# Patient Record
Sex: MALE | Race: WHITE | Employment: FULL TIME | URBAN - NONMETROPOLITAN AREA
[De-identification: names, ages, dates, MRNs, and addresses within clinical notes are randomized per-mention and may not be internally consistent; named-entity substitution may affect disease eponyms.]

---

## 2019-01-30 ENCOUNTER — HOSPITAL ENCOUNTER (OUTPATIENT)
Dept: GENERAL RADIOLOGY | Age: 54
Discharge: HOME | End: 2019-02-01
Payer: COMMERCIAL

## 2019-01-30 ENCOUNTER — OFFICE VISIT (OUTPATIENT)
Dept: PRIMARY CARE CLINIC | Age: 54
End: 2019-01-30
Payer: COMMERCIAL

## 2019-01-30 VITALS
WEIGHT: 180 LBS | DIASTOLIC BLOOD PRESSURE: 80 MMHG | TEMPERATURE: 98.2 F | OXYGEN SATURATION: 98 % | SYSTOLIC BLOOD PRESSURE: 126 MMHG | HEART RATE: 70 BPM

## 2019-01-30 DIAGNOSIS — Z72.0 TOBACCO ABUSE: ICD-10-CM

## 2019-01-30 DIAGNOSIS — R07.81 RIB PAIN ON RIGHT SIDE: Primary | ICD-10-CM

## 2019-01-30 DIAGNOSIS — R07.81 RIB PAIN ON RIGHT SIDE: ICD-10-CM

## 2019-01-30 PROCEDURE — 99213 OFFICE O/P EST LOW 20 MIN: CPT | Performed by: FAMILY MEDICINE

## 2019-01-30 PROCEDURE — 71101 X-RAY EXAM UNILAT RIBS/CHEST: CPT

## 2019-01-30 ASSESSMENT — PATIENT HEALTH QUESTIONNAIRE - PHQ9
1. LITTLE INTEREST OR PLEASURE IN DOING THINGS: 0
SUM OF ALL RESPONSES TO PHQ9 QUESTIONS 1 & 2: 0
SUM OF ALL RESPONSES TO PHQ QUESTIONS 1-9: 0
SUM OF ALL RESPONSES TO PHQ QUESTIONS 1-9: 0
2. FEELING DOWN, DEPRESSED OR HOPELESS: 0

## 2019-04-22 ENCOUNTER — TELEPHONE (OUTPATIENT)
Dept: SURGERY | Age: 54
End: 2019-04-22

## 2019-04-22 ENCOUNTER — OFFICE VISIT (OUTPATIENT)
Dept: FAMILY MEDICINE CLINIC | Age: 54
End: 2019-04-22
Payer: COMMERCIAL

## 2019-04-22 VITALS
SYSTOLIC BLOOD PRESSURE: 128 MMHG | WEIGHT: 177 LBS | DIASTOLIC BLOOD PRESSURE: 90 MMHG | HEIGHT: 72 IN | BODY MASS INDEX: 23.98 KG/M2

## 2019-04-22 DIAGNOSIS — Z12.11 COLON CANCER SCREENING: ICD-10-CM

## 2019-04-22 DIAGNOSIS — Z00.00 WELLNESS EXAMINATION: Primary | ICD-10-CM

## 2019-04-22 PROCEDURE — 99396 PREV VISIT EST AGE 40-64: CPT | Performed by: FAMILY MEDICINE

## 2019-04-22 RX ORDER — VARENICLINE TARTRATE 25 MG
KIT ORAL
Qty: 1 BOX | Refills: 0 | Status: SHIPPED | OUTPATIENT
Start: 2019-04-22 | End: 2019-05-23

## 2019-04-22 NOTE — PATIENT INSTRUCTIONS
will tell you how often to have this done based on your overall health and other things that can increase your risk for heart attack and stroke. · Blood pressure. Have your blood pressure checked during a routine doctor visit. Your doctor will tell you how often to check your blood pressure based on your age, your blood pressure results, and other factors. · Prostate exam. Talk to your doctor about whether you should have a blood test (called a PSA test) for prostate cancer. Experts disagree on whether men should have this test. Some experts recommend that you discuss the benefits and risks of the test with your doctor. · Diabetes. Ask your doctor whether you should have tests for diabetes. · Vision. Some experts recommend that you have yearly exams for glaucoma and other age-related eye problems starting at age 48. · Hearing. Tell your doctor if you notice any change in your hearing. You can have tests to find out how well you hear. · Colon cancer. You should begin tests for colon cancer at age 48. You may have one of several tests. Your doctor will tell you how often to have tests based on your age and risk. Risks include whether you already had a precancerous polyp removed from your colon or whether your parent, brother, sister, or child has had colon cancer. · Heart attack and stroke risk. At least every 4 to 6 years, you should have your risk for heart attack and stroke assessed. Your doctor uses factors such as your age, blood pressure, cholesterol, and whether you smoke or have diabetes to show what your risk for a heart attack or stroke is over the next 10 years. · Abdominal aortic aneurysm. Ask your doctor whether you should have a test to check for an aneurysm. You may need a test if you ever smoked or if your parent, brother, sister, or child has had an aneurysm. When should you call for help?   Watch closely for changes in your health, and be sure to contact your doctor if you have any problems or symptoms that concern you. Where can you learn more? Go to https://chpepiceweb.[employer]. org and sign in to your [employer] account. Enter P361 in the [facility] box to learn more about \"Well Visit, Men 48 to 72: Care Instructions. \"     If you do not have an account, please click on the \"Sign Up Now\" link. Current as of: March 28, 2018  Content Version: 11.9  © 7799-2318 [employer]. Care instructions adapted under license by [facility] ([facility]). If you have questions about a medical condition or this instruction, always ask your healthcare professional. [place] [address] any warranty or liability for your use of this information. Patient Education        Deciding About Using Medicines To Quit Smoking  How can you decide about using medicines to quit smoking? What are the medicines you can use? Your doctor may prescribe varenicline (Chantix) or bupropion (Zyban). These medicines can help you cope with cravings for tobacco. They are pills that don't contain nicotine. You also can use nicotine replacement products. These do contain nicotine. There are many types. · Gum and lozenges slowly release nicotine into your mouth. · Patches stick to your skin. They slowly release nicotine into your bloodstream.  · An inhaler has a griffith that contains nicotine. You breathe in a puff of nicotine vapor through your mouth and throat. · Nasal spray releases a mist that contains nicotine. What are key points about this decision? · Using medicines can double your chances of quitting smoking. They can ease cravings and withdrawal symptoms. · Getting counseling along with using medicine can raise your chances of quitting even more. · If you smoke fewer than 5 cigarettes a day, you may not need medicines to help you quit smoking. · These medicines have less nicotine than cigarettes. And by itself, nicotine is not nearly as harmful as smoking.  The tars, carbon monoxide, and other toxic chemicals in tobacco cause the harmful effects. · The side effects of nicotine replacement products depend on the type of product. For example, a patch can make your skin red and itchy. Medicines in pill form can make you sick to your stomach. They can also cause dry mouth and trouble sleeping. For most people, the side effects are not bad enough to make them stop using the products. Why might you choose to use medicines to quit smoking? · You have tried on your own to stop smoking, but you were not able to stop. · You smoke more than 5 cigarettes a day. · You want to increase your chances of quitting smoking. · You want to reduce your cravings and withdrawal symptoms. · You feel the benefits of medicine outweigh the side effects. Why might you choose not to use medicine? · You want to try quitting on your own by stopping all at once (\"cold turkey\"). · You want to cut back slowly on the number of cigarettes you smoke. · You smoke fewer than 5 cigarettes a day. · You do not like using medicine. · You feel the side effects of medicines outweigh the benefits. · You are worried about the cost of medicines. Your decision  Thinking about the facts and your feelings can help you make a decision that is right for you. Be sure you understand the benefits and risks of your options, and think about what else you need to do before you make the decision. Where can you learn more? Go to https://andria.[employer]. org and sign in to your [employer] account. Enter O790 in the [facility] box to learn more about \"Deciding About Using Medicines To Quit Smoking. \"     If you do not have an account, please click on the \"Sign Up Now\" link. Current as of: September 26, 2018  Content Version: 11.9  © 7220-9126 [employer]. Care instructions adapted under license by [facility] ([facility]).  If you have questions about a medical condition or this instruction, always ask your healthcare elder. [address] any warranty or liability for your use of this information. Patient Education        varenicline  Pronunciation:  marj Dinero  Brand:  [place]  What is the most important information I should know about varenicline? Do not drink large amounts alcohol. Varenicline can increase the effects of alcohol or change the way you react to it. What is varenicline? Varenicline is a smoking cessation medicine. It is used together with behavior modification and counseling support to help you stop smoking. Varenicline may also be used for purposes not listed in this medication guide. What should I discuss with my health care provider before taking varenicline? You should not use varenicline if you used it in the past and had:  · a serious allergic reaction --trouble breathing, swelling in your face (lips, tongue, throat) or neck; or  · a serious skin reaction --blisters in your mouth, peeling skin rash. To make sure varenicline is safe for you, tell your doctor if you have:  · a history of depression or mental illness;  · a history of seizures;  · kidney disease (or if you are on dialysis);  · heart disease, circulation problems; or  · if you drink alcohol. It is not known whether this medicine will harm an unborn baby. Tell your doctor if you are pregnant or plan to become pregnant. It is not known whether varenicline passes into breast milk. However, if you breast-feed while using this medicine, your baby may spit up or vomit more than normal, and may have a seizure. Varenicline is not approved for use by anyone younger than 25years old. How should I take varenicline? Follow all directions on your prescription label. Do not take this medicine in larger or smaller amounts or for longer than recommended. When you first start taking varenicline, you will take a low dose and then gradually increase it over the first several days of treatment.  Follow your doctor's dosing instructions very carefully. You may choose from 3 ways to use varenicline. Ask your doctor which method is best for you:  · Set a date to quit smoking and start taking varenicline 1 week before that date. This will allow the drug to build up in your body. Make sure to quit smoking on your planned quit date. Take varenicline for a total of 12 weeks. · Start taking varenicline before you set a planned quit date. Once you start taking the medicine, choose a quit date that is between 8 and 35 days after you start treatment. Take varenicline for a total of 12 weeks. · Start taking varenicline and gradually reduce the number of cigarettes you smoke each day over a 12-week period, until you no longer smoke at all. Then take varenicline for another 12 weeks, for a total of 24 weeks. Take varenicline after eating. Take the medicine with a full glass of water. Use varenicline regularly to get the most benefit. Get your prescription refilled before you run out of medicine completely. You should remain under the care of a doctor while taking varenicline. Read all patient information, medication guides, and instruction sheets provided to you. Ask your doctor or pharmacist if you have any questions. You may have nicotine withdrawal symptoms when you stop smoking, including: increased appetite, weight gain, trouble sleeping, trouble concentrating, slower heart rate, having the urge to smoke, and feeling anxious, restless, depressed, angry, frustrated, or irritated. These symptoms may occur with or without using medication such as varenicline. Smoking cessation may also cause new or worsening mental health problems, such as depression. Store at room temperature away from moisture and heat. What happens if I miss a dose? Take the missed dose as soon as you remember. Skip the missed dose if it is almost time for your next scheduled dose. Do not take extra medicine to make up the missed dose.   What happens if I overdose? Seek emergency medical attention or call the Poison Help line at 1-999.789.9246. What should I avoid while taking varenicline? Do not drink large amounts alcohol while taking this medicine. Varenicline can increase the effects of alcohol or change the way you react to it. Some people taking varenicline have had unusual or aggressive behavior or forgetfulness while drinking alcohol. Do not use other medicines to quit smoking, unless your doctor tells you to. Using varenicline while wearing a nicotine patch can cause unpleasant side effects. This medicine may impair your thinking or reactions. You may also have mood or behavior changes when you quit smoking. Until you know how varenicline and the smoking cessation process are going to affect you, be careful if you drive or do anything that requires you to be cautious and alert. What are the possible side effects of varenicline? Get emergency medical help if you have signs of an allergic reaction: hives; difficulty breathing; swelling of your face, lips, tongue, or throat. Stop using varenicline and call your doctor at once if you have:  · a seizure (convulsions);  · thoughts about suicide or hurting yourself;  · strange dreams, sleepwalking, trouble sleeping;  · new or worsening mental health problems --mood or behavior changes, depression, agitation, hostility, aggression;  · heart attack symptoms --chest pain or pressure, pain spreading to your jaw or shoulder, nausea, sweating;  · stroke symptoms --sudden numbness or weakness (especially on one side of the body), slurred speech, problems with vision or balance; or  · severe skin reaction --swelling or redness of the skin, blisters in your mouth, or skin rash that spreads and causes blistering and peeling. Your family or other caregivers should also be alert to changes in your mood or behavior.   Common side effects may include:  · nausea (may persist for several months), vomiting;  · constipation, gas;  · sleep problems (insomnia); or  · unusual dreams. This is not a complete list of side effects and others may occur. Call your doctor for medical advice about side effects. You may report side effects to FDA at 2-303-VOJ-4303. What other drugs will affect varenicline? After you stop smoking, the doses of your other medications may need to be adjusted. Tell your doctor about all other medicines you use, especially:  · insulin;  · a blood thinner such as warfarin (Coumadin, Jantoven); or  · asthma medicine (theophylline and others). This list is not complete. Other drugs may interact with varenicline, including prescription and over-the-counter medicines, vitamins, and herbal products. Not all possible interactions are listed in this medication guide. Where can I get more information? Your pharmacist can provide more information about varenicline. Remember, keep this and all other medicines out of the reach of children, never share your medicines with others, and use this medication only for the indication prescribed. Every effort has been made to ensure that the information provided by [address] is accurate, up-to-date, and complete, but no guarantee is made to that effect. Drug information contained herein may be time sensitive. [facility] information has been compiled for use by healthcare practitioners and consumers in the [place] and therefore [facility] does not warrant that uses outside of the [place] are appropriate, unless specifically indicated otherwise. [facility]'s drug information does not endorse drugs, diagnose patients or recommend therapy. [facility] drug information is an informational resource designed to assist licensed healthcare practitioners in caring for their patients and/or to serve consumers viewing this service as a supplement to, and not a substitute for, the expertise, skill, knowledge and judgment of healthcare practitioners. The absence of a warning for a given drug or drug combination in no way should be construed to indicate that the drug or drug combination is safe, effective or appropriate for any given patient. [facility] does not assume any responsibility for any aspect of healthcare administered with the aid of information [facility] provides. The information contained herein is not intended to cover all possible uses, directions, precautions, warnings, drug interactions, allergic reactions, or adverse effects. If you have questions about the drugs you are taking, check with your doctor, nurse or pharmacist.  Copyright 1465-5659 [address]. Version: 8.03. Revision date: 12/21/2016. Care instructions adapted under license by [facility] ([facility]). If you have questions about a medical condition or this instruction, always ask your healthcare professional. [place] [address] any warranty or liability for your use of this information.

## 2019-04-22 NOTE — PROGRESS NOTES
History and Physical      Fernie Chowdhury  YOB: 1965    Date of Service:  4/22/2019    Chief Complaint:   Fernie Chowdhury is a 48 y.o. male who presents for complete physical examination. HPI: would like to quit smoking has been smoking around 31 years    Wt Readings from Last 3 Encounters:   04/22/19 177 lb (80.3 kg)   01/30/19 180 lb (81.6 kg)     BP Readings from Last 3 Encounters:   04/22/19 (!) 128/90   01/30/19 126/80       Patient Active Problem List   Diagnosis    Tobacco abuse       Preventive Care:  Health Maintenance   Topic Date Due    Hepatitis C screen  1965    Pneumococcal 0-64 years Vaccine (1 of 1 - PPSV23) 06/05/1971    HIV screen  06/05/1980    DTaP/Tdap/Td vaccine (1 - Tdap) 06/05/1984    Lipid screen  06/05/2005    Shingles Vaccine (1 of 2) 06/05/2015    Colon cancer screen colonoscopy  06/05/2015    Flu vaccine (Season Ended) 09/01/2019      Self-testicular exams: No  Sexual activity: has sex with females   Last eye exam: remote,   Exercise: no regular exercise  Seatbelt use: yes  Lipid panel:  No results found for: CHOL, TRIG, HDL, LDLCALC, LDLDIRECT    Living will: no,   additional information provided reviewed       There is no immunization history on file for this patient. No Known Allergies  Outpatient Medications Marked as Taking for the 4/22/19 encounter (Office Visit) with Moreno Carroll MD   Medication Sig Dispense Refill    MEGARED OMEGA-3 KRILL OIL PO Take by mouth         History reviewed. No pertinent past medical history. History reviewed. No pertinent surgical history. History reviewed. No pertinent family history.   Social History     Socioeconomic History    Marital status: Single     Spouse name: Not on file    Number of children: Not on file    Years of education: Not on file    Highest education level: Not on file   Occupational History    Not on file   Social Needs    Financial resource strain: Not on file   [employer] insecurity:     Worry: Not on file     Inability: Not on file    Transportation needs:     Medical: Not on file     Non-medical: Not on file   Tobacco Use    Smoking status: Current Every Day Smoker    Smokeless tobacco: Never Used   Substance and Sexual Activity    Alcohol use: Not on file    Drug use: Not on file    Sexual activity: Not on file   Lifestyle    Physical activity:     Days per week: Not on file     Minutes per session: Not on file    Stress: Not on file   Relationships    Social connections:     Talks on phone: Not on file     Gets together: Not on file     Attends Yazdanism service: Not on file     Active member of club or organization: Not on file     Attends meetings of clubs or organizations: Not on file     Relationship status: Not on file    Intimate partner violence:     Fear of current or ex partner: Not on file     Emotionally abused: Not on file     Physically abused: Not on file     Forced sexual activity: Not on file   Other Topics Concern    Not on file   Social History Narrative    Not on file       Review of Systems:      Physical Exam:   Vitals:    04/22/19 0756 04/22/19 0808   BP: (!) 140/90 (!) 128/90   Site: Right Upper Arm Right Upper Arm   Position: Sitting Sitting   Cuff Size: Medium Adult Medium Adult   Weight: 177 lb (80.3 kg)    Height: 6' (1.829 m)      Body mass index is 24.01 kg/m². Constitutional: He is oriented to person, place, and time. He appears well-developed and well-nourished. No distress. HEENT:   Head: Normocephalic and atraumatic. Right Ear: Tympanic membrane, external ear and ear canal normal.   Left Ear: Tympanic membrane, external ear and ear canal normal.   Nose: Nose normal.   Mouth/Throat: Oropharynx is clear and moist and mucous membranes are normal. No oropharyngeal exudate or posterior oropharyngeal erythema. He has no cervical adenopathy.    Eyes: Conjunctivae and extraocular motions are normal. Pupils are equal, round, and reactive to light.   Neck: Full passive range of motion without pain. Neck supple. No JVD present. Carotid bruit is not present. No mass and no thyromegaly present. Cardiovascular: Normal rate, regular rhythm, normal heart sounds and intact distal pulses. Exam reveals no gallop and no friction rub. No murmur heard. Pulmonary/Chest: Effort normal and breath sounds normal. No respiratory distress. He has no wheezes, rhonchi or rales. Abdominal: Soft, non-tender. Bowel sounds and aorta are normal. There is no organomegaly, mass or bruit. Genitourinary:  Deferred . Musculoskeletal: Normal range of motion, no synovitis. He exhibits no edema. Neurological: He is alert and oriented to person, place, and time. He has normal reflexes. No cranial nerve deficit. Coordination normal.   Skin: Skin is warm, dry and intact. No suspicious lesions are noted. Psychiatric: He has a normal mood and affect.  His speech is normal and behavior is normal. Judgment, cognition and memory are normal.     Assessment/Plan:  Tobacco use       Labs per orders  Advanced directives given and reviewed  chantix given   Refused hep c and hiv and but will discuss with spouse  Reviewed immunizations  Discussed colonoscopy and will make a referral   Vitamin d 3 2000 iu a day  As long as well follow up 1 year

## 2019-04-25 ENCOUNTER — HOSPITAL ENCOUNTER (OUTPATIENT)
Dept: LAB | Age: 54
Discharge: HOME | End: 2019-04-25
Payer: COMMERCIAL

## 2019-04-25 DIAGNOSIS — Z00.00 WELLNESS EXAMINATION: ICD-10-CM

## 2019-04-25 LAB
-: NORMAL
ABSOLUTE EOS #: 0.1 K/UL (ref 0–0.4)
ABSOLUTE IMMATURE GRANULOCYTE: NORMAL K/UL (ref 0–0.3)
ABSOLUTE LYMPH #: 2.2 K/UL (ref 1–4.8)
ABSOLUTE MONO #: 0.5 K/UL (ref 0.1–1.2)
AMORPHOUS: NORMAL
ANION GAP SERPL CALCULATED.3IONS-SCNC: 11 MMOL/L (ref 9–17)
BACTERIA: NORMAL
BASOPHILS # BLD: 1 % (ref 0–2)
BASOPHILS ABSOLUTE: 0.1 K/UL (ref 0–0.2)
BILIRUBIN URINE: NEGATIVE
BUN BLDV-MCNC: 10 MG/DL (ref 6–20)
BUN/CREAT BLD: 13 (ref 9–20)
CALCIUM SERPL-MCNC: 9.2 MG/DL (ref 8.6–10.4)
CASTS UA: NORMAL /LPF (ref 0–2)
CHLORIDE BLD-SCNC: 100 MMOL/L (ref 98–107)
CO2: 27 MMOL/L (ref 20–31)
COLOR: NORMAL
COMMENT UA: NORMAL
CREAT SERPL-MCNC: 0.76 MG/DL (ref 0.7–1.2)
CRYSTALS, UA: NORMAL /HPF
DIFFERENTIAL TYPE: NORMAL
EOSINOPHILS RELATIVE PERCENT: 1 % (ref 1–8)
EPITHELIAL CELLS UA: NORMAL /HPF (ref 0–5)
GFR AFRICAN AMERICAN: >60 ML/MIN
GFR NON-AFRICAN AMERICAN: >60 ML/MIN
GFR SERPL CREATININE-BSD FRML MDRD: ABNORMAL ML/MIN/{1.73_M2}
GFR SERPL CREATININE-BSD FRML MDRD: ABNORMAL ML/MIN/{1.73_M2}
GLUCOSE BLD-MCNC: 107 MG/DL (ref 70–99)
GLUCOSE URINE: NEGATIVE
HCT VFR BLD CALC: 48.1 % (ref 41–53)
HEMOGLOBIN: 15.7 G/DL (ref 13.5–17.5)
IMMATURE GRANULOCYTES: NORMAL %
KETONES, URINE: NEGATIVE
LEUKOCYTE ESTERASE, URINE: NEGATIVE
LYMPHOCYTES # BLD: 31 % (ref 15–43)
MCH RBC QN AUTO: 31.5 PG (ref 26–34)
MCHC RBC AUTO-ENTMCNC: 32.8 G/DL (ref 31–37)
MCV RBC AUTO: 96.2 FL (ref 80–100)
MONOCYTES # BLD: 7 % (ref 6–14)
MUCUS: NORMAL
NITRITE, URINE: NEGATIVE
NRBC AUTOMATED: NORMAL PER 100 WBC
OTHER OBSERVATIONS UA: NORMAL
PDW BLD-RTO: 13.2 % (ref 11–14.5)
PH UA: 6 (ref 5–6)
PLATELET # BLD: 233 K/UL (ref 140–450)
PLATELET ESTIMATE: NORMAL
PMV BLD AUTO: 9.1 FL (ref 6–12)
POTASSIUM SERPL-SCNC: 4.2 MMOL/L (ref 3.7–5.3)
PROSTATE SPECIFIC ANTIGEN: 0.77 UG/L
PROTEIN UA: NEGATIVE
RBC # BLD: 4.99 M/UL (ref 4.5–5.9)
RBC # BLD: NORMAL 10*6/UL
RBC UA: NORMAL /HPF (ref 0–4)
RENAL EPITHELIAL, UA: NORMAL /HPF
SEG NEUTROPHILS: 60 % (ref 44–74)
SEGMENTED NEUTROPHILS ABSOLUTE COUNT: 4.2 K/UL (ref 1.8–7.7)
SODIUM BLD-SCNC: 138 MMOL/L (ref 135–144)
SPECIFIC GRAVITY UA: 1.01 (ref 1.01–1.02)
TRICHOMONAS: NORMAL
TURBIDITY: NORMAL
URINE HGB: NEGATIVE
UROBILINOGEN, URINE: NORMAL
WBC # BLD: 7.1 K/UL (ref 3.5–11)
WBC # BLD: NORMAL 10*3/UL
WBC UA: NORMAL /HPF (ref 0–4)
YEAST: NORMAL

## 2019-04-25 PROCEDURE — 81001 URINALYSIS AUTO W/SCOPE: CPT

## 2019-04-25 PROCEDURE — G0103 PSA SCREENING: HCPCS

## 2019-04-25 PROCEDURE — 85025 COMPLETE CBC W/AUTO DIFF WBC: CPT

## 2019-04-25 PROCEDURE — 36415 COLL VENOUS BLD VENIPUNCTURE: CPT

## 2019-04-25 PROCEDURE — 80048 BASIC METABOLIC PNL TOTAL CA: CPT

## 2019-05-23 RX ORDER — VARENICLINE TARTRATE 1 MG/1
1 TABLET, FILM COATED ORAL 2 TIMES DAILY
Qty: 60 TABLET | Refills: 5 | Status: SHIPPED | OUTPATIENT
Start: 2019-05-23 | End: 2021-07-19 | Stop reason: SDUPTHER

## 2019-07-26 ENCOUNTER — HOSPITAL ENCOUNTER (EMERGENCY)
Age: 54
Discharge: HOME | End: 2019-07-26
Attending: EMERGENCY MEDICINE
Payer: COMMERCIAL

## 2019-07-26 VITALS
WEIGHT: 180 LBS | RESPIRATION RATE: 16 BRPM | BODY MASS INDEX: 25.2 KG/M2 | HEIGHT: 71 IN | DIASTOLIC BLOOD PRESSURE: 86 MMHG | SYSTOLIC BLOOD PRESSURE: 128 MMHG | TEMPERATURE: 97.7 F | HEART RATE: 71 BPM | OXYGEN SATURATION: 98 %

## 2019-07-26 DIAGNOSIS — T15.92XA ACUTE FOREIGN BODY OF LEFT EYE, INITIAL ENCOUNTER: Primary | ICD-10-CM

## 2019-07-26 PROCEDURE — 99282 EMERGENCY DEPT VISIT SF MDM: CPT

## 2019-07-26 PROCEDURE — 6370000000 HC RX 637 (ALT 250 FOR IP): Performed by: EMERGENCY MEDICINE

## 2019-07-26 RX ORDER — ERYTHROMYCIN 5 MG/G
OINTMENT OPHTHALMIC ONCE
Status: COMPLETED | OUTPATIENT
Start: 2019-07-26 | End: 2019-07-26

## 2019-07-26 RX ADMIN — ERYTHROMYCIN: 5 OINTMENT OPHTHALMIC at 01:47

## 2019-07-26 NOTE — ED PROVIDER NOTES
placed erythromycin ophthalmic in the left eye and I told him to do this 3 times a day and he was given instructions to use an eye patch as well    At this time the patient is without objective evidence of an acute process requiring hospitalization or inpatient management. They have remained hemodynamically stable throughout their entire ED visit and are stable for discharge with outpatient follow-up. Standard anticipatory guidance given to patient upon discharge. Have given them a specific time frame in which to follow-up and who to follow-up with. I have also advised them that they should return to the emergency department if they get worse, or not getting better or develop any new or concerning symptoms. Patient demonstrates understanding. PROCEDURES:  Unless otherwise noted below, none     Procedures    FINAL IMPRESSION      1.  Acute foreign body of left eye, initial encounter          DISPOSITION/PLAN   DISPOSITION Decision To Discharge 07/26/2019 01:37:54 AM      PATIENT REFERRED TO:  Your eye doctor tomorrow    In 1 day      Aurea Sanchez MD  [address] [place] [address]  [phone]            DISCHARGE MEDICATIONS:  Discharge Medication List as of 7/26/2019  1:50 AM             (Please note that portions of this note were completed with a voice recognition program.  Efforts were made to edit the dictations but occasionally words are mis-transcribed.)    Shyla Alberts DO (electronically signed)  Board Certified Emergency Physician          Shyla Alberts DO  07/26/19 0211

## 2019-10-01 ENCOUNTER — OFFICE VISIT (OUTPATIENT)
Dept: FAMILY MEDICINE CLINIC | Age: 54
End: 2019-10-01
Payer: COMMERCIAL

## 2019-10-01 VITALS
HEART RATE: 83 BPM | DIASTOLIC BLOOD PRESSURE: 86 MMHG | WEIGHT: 173 LBS | OXYGEN SATURATION: 98 % | BODY MASS INDEX: 24.13 KG/M2 | SYSTOLIC BLOOD PRESSURE: 128 MMHG

## 2019-10-01 DIAGNOSIS — L98.9 SKIN LESION: Primary | ICD-10-CM

## 2019-10-01 DIAGNOSIS — Z23 NEED FOR HEPATITIS B VACCINATION: ICD-10-CM

## 2019-10-01 DIAGNOSIS — Z12.11 SCREENING FOR COLON CANCER: ICD-10-CM

## 2019-10-01 PROCEDURE — 99213 OFFICE O/P EST LOW 20 MIN: CPT | Performed by: FAMILY MEDICINE

## 2019-10-01 PROCEDURE — 90471 IMMUNIZATION ADMIN: CPT | Performed by: FAMILY MEDICINE

## 2019-10-01 PROCEDURE — 90746 HEPB VACCINE 3 DOSE ADULT IM: CPT | Performed by: FAMILY MEDICINE

## 2019-10-01 ASSESSMENT — ENCOUNTER SYMPTOMS
RESPIRATORY NEGATIVE: 1
GASTROINTESTINAL NEGATIVE: 1

## 2019-10-04 ENCOUNTER — TELEPHONE (OUTPATIENT)
Dept: SURGERY | Age: 54
End: 2019-10-04

## 2019-10-04 RX ORDER — OMEPRAZOLE 20 MG/1
20 CAPSULE, DELAYED RELEASE ORAL DAILY PRN
COMMUNITY

## 2019-11-14 ENCOUNTER — NURSE ONLY (OUTPATIENT)
Dept: LAB | Age: 54
End: 2019-11-14
Payer: COMMERCIAL

## 2019-11-14 DIAGNOSIS — Z23 NEED FOR HEPATITIS B VACCINATION: ICD-10-CM

## 2019-11-14 PROCEDURE — 90471 IMMUNIZATION ADMIN: CPT | Performed by: FAMILY MEDICINE

## 2019-11-14 PROCEDURE — 90746 HEPB VACCINE 3 DOSE ADULT IM: CPT | Performed by: FAMILY MEDICINE

## 2021-02-01 ENCOUNTER — OFFICE VISIT (OUTPATIENT)
Dept: ORTHOPEDIC SURGERY | Age: 56
End: 2021-02-01
Payer: COMMERCIAL

## 2021-02-01 ENCOUNTER — HOSPITAL ENCOUNTER (OUTPATIENT)
Dept: LAB | Age: 56
Discharge: HOME | End: 2021-02-01
Payer: COMMERCIAL

## 2021-02-01 VITALS
DIASTOLIC BLOOD PRESSURE: 87 MMHG | HEIGHT: 71 IN | WEIGHT: 173 LBS | SYSTOLIC BLOOD PRESSURE: 124 MMHG | BODY MASS INDEX: 24.22 KG/M2 | HEART RATE: 93 BPM

## 2021-02-01 DIAGNOSIS — M79.89 FINGER SWELLING: Primary | ICD-10-CM

## 2021-02-01 DIAGNOSIS — M79.89 FINGER SWELLING: ICD-10-CM

## 2021-02-01 LAB
ABSOLUTE EOS #: 0.1 K/UL (ref 0–0.44)
ABSOLUTE IMMATURE GRANULOCYTE: 0.05 K/UL (ref 0–0.3)
ABSOLUTE LYMPH #: 2.99 K/UL (ref 1.1–3.7)
ABSOLUTE MONO #: 0.79 K/UL (ref 0.1–1.2)
BASOPHILS # BLD: 1 % (ref 0–2)
BASOPHILS ABSOLUTE: 0.07 K/UL (ref 0–0.2)
C-REACTIVE PROTEIN: 8.6 MG/L (ref 0–5)
DIFFERENTIAL TYPE: ABNORMAL
EOSINOPHILS RELATIVE PERCENT: 1 % (ref 1–4)
HCT VFR BLD CALC: 48.2 % (ref 40.7–50.3)
HEMOGLOBIN: 15.7 G/DL (ref 13–17)
IMMATURE GRANULOCYTES: 0 %
LYMPHOCYTES # BLD: 26 % (ref 24–43)
MCH RBC QN AUTO: 31 PG (ref 25.2–33.5)
MCHC RBC AUTO-ENTMCNC: 32.6 G/DL (ref 25.2–33.5)
MCV RBC AUTO: 95.3 FL (ref 82.6–102.9)
MONOCYTES # BLD: 7 % (ref 3–12)
NRBC AUTOMATED: 0 PER 100 WBC
PDW BLD-RTO: 12.5 % (ref 11.8–14.4)
PLATELET # BLD: 246 K/UL (ref 138–453)
PLATELET ESTIMATE: ABNORMAL
PMV BLD AUTO: 10 FL (ref 8.1–13.5)
RBC # BLD: 5.06 M/UL (ref 4.21–5.77)
RBC # BLD: ABNORMAL 10*6/UL
RHEUMATOID FACTOR: <10 IU/ML
SEDIMENTATION RATE, ERYTHROCYTE: 4 MM (ref 0–20)
SEG NEUTROPHILS: 65 % (ref 36–65)
SEGMENTED NEUTROPHILS ABSOLUTE COUNT: 7.72 K/UL (ref 1.5–8.1)
WBC # BLD: 11.7 K/UL (ref 3.5–11.3)
WBC # BLD: ABNORMAL 10*3/UL

## 2021-02-01 PROCEDURE — 86038 ANTINUCLEAR ANTIBODIES: CPT

## 2021-02-01 PROCEDURE — 99203 OFFICE O/P NEW LOW 30 MIN: CPT | Performed by: ORTHOPAEDIC SURGERY

## 2021-02-01 PROCEDURE — 85651 RBC SED RATE NONAUTOMATED: CPT

## 2021-02-01 PROCEDURE — 86431 RHEUMATOID FACTOR QUANT: CPT

## 2021-02-01 PROCEDURE — 36415 COLL VENOUS BLD VENIPUNCTURE: CPT

## 2021-02-01 PROCEDURE — 86140 C-REACTIVE PROTEIN: CPT

## 2021-02-01 PROCEDURE — 85025 COMPLETE CBC W/AUTO DIFF WBC: CPT

## 2021-02-01 RX ORDER — DOXYCYCLINE HYCLATE 100 MG
TABLET ORAL
COMMUNITY
Start: 2021-01-31 | End: 2021-02-17

## 2021-02-01 NOTE — PROGRESS NOTES
Orthopedic Office Note  [facility]  [address]  [address], [address]  [facility] [address] [phone]      CHIEF COMPLAINT:    Chief Complaint   Patient presents with    Hand Pain     left hand, index finger - poss forign body? HISTORY OF PRESENT ILLNESS:      The patient is a 54 y.o. male  who presents today left index finger swelling which started over a week ago. He works as  but does not recall ever getting anything lodged into his finger. He noticed swelling in the finger which has progressed. Last night he presented to urgent care where he was given a shot of Rocephin and placed on doxycycline. He denies fevers, chills, or night sweats. He never has noticed any open wounds. Past Medical History:    No past medical history on file. Past Surgical History:    Past Surgical History:   Procedure Laterality Date    COLONOSCOPY  11/01/2019       Medications Prior to Admission:   Current Outpatient Medications   Medication Sig Dispense Refill    doxycycline hyclate (VIBRA-TABS) 100 MG tablet take 1 tablet by mouth twice a day for 10 days      bisacodyl (BISACODYL) 5 MG EC tablet Follow bowel prep instructions given by physician's office. 2 tablet 0    omeprazole (PRILOSEC) 20 MG delayed release capsule Take 20 mg by mouth daily      varenicline (CHANTIX) 1 MG tablet Take 1 tablet by mouth 2 times daily (Patient not taking: Reported on 10/1/2019) 60 tablet 5    MEGARED OMEGA-3 KRILL OIL PO Take by mouth       No current facility-administered medications for this visit. Allergies:  Patient has no known allergies.     Social History:   Social History     Tobacco Use   Smoking Status Current Every Day Smoker   Smokeless Tobacco Never Used     Social History     Substance and Sexual Activity   Alcohol Use None    Comment: occsionally     Social History     Substance and Sexual Activity   Drug Use Not on file       Family History:  No family history on file. REVIEW OF SYSTEMS:  Please see the ROS form attached to today's encounter. I have reviewed it with the patient during the visit. All other systems were reviewed and are negative. PHYSICAL EXAM:  On exam today he is alert and oriented x3. He is in no obvious distress. His general appearance within normal limits. Left index finger is moderately swollen. There is just mild redness over the palmar aspect of the middle phalanx of the index finger. He has full painless finger range of motion. He has just slight discomfort with palpation over the middle phalanx volar aspect. He has independent IP joint flexion without difficulty and against resistance. He has full extension without any difficulty. He has no pain with passive stretch. He has normal sensation with good capillary refill. Radiology:  X-rays reviewed and show diffuse soft tissue swelling and a foreign body within the tip of the finger tuft which does not localize to the area of maximal swelling in the finger    ASSESSMENT/PLAN:  1. Finger swelling        Clinically this either represents a low-grade infection or noninfectious tenosynovitis. No sign of flexor infectious tenosynovitis today. No abscess. We will continue with the antibiotic. If this does not resolve his issue then we will place him on an anti-inflammatory. He will be leaving for [place] and 3 days. I reviewed signs of infection with him and if these develop he will present to the emergency room. He will follow-up with me once he returns from [place] in 2 weeks. We will obtain baseline laboratory studies including a CRP with differential, sedimentation rate, ESR, rheumatoid factor, CHAYITO. No orders of the defined types were placed in this encounter.        Prath Medeiros MD

## 2021-02-02 LAB — ANTI-NUCLEAR ANTIBODY (ANA): NEGATIVE

## 2021-02-17 ENCOUNTER — OFFICE VISIT (OUTPATIENT)
Dept: ORTHOPEDIC SURGERY | Age: 56
End: 2021-02-17
Payer: COMMERCIAL

## 2021-02-17 VITALS
HEIGHT: 71 IN | BODY MASS INDEX: 24.22 KG/M2 | DIASTOLIC BLOOD PRESSURE: 84 MMHG | WEIGHT: 173 LBS | SYSTOLIC BLOOD PRESSURE: 136 MMHG | HEART RATE: 83 BPM

## 2021-02-17 DIAGNOSIS — M79.89 FINGER SWELLING: Primary | ICD-10-CM

## 2021-02-17 PROCEDURE — 99213 OFFICE O/P EST LOW 20 MIN: CPT | Performed by: ORTHOPAEDIC SURGERY

## 2021-02-18 ENCOUNTER — PRE-PROCEDURE TELEPHONE (OUTPATIENT)
Dept: PREADMISSION TESTING | Age: 56
End: 2021-02-18

## 2021-02-18 NOTE — PROGRESS NOTES
History and Physical    Patient's Name/Date of Birth: Franko Cadena / 1965, 54 y.o. yo    Date: February 17, 2021     PCP: No primary care provider on file. History of Present Illness: This 59-year-old right-hand dominant male presents with them nearly 6-week history of localized swelling to his right index finger with no known specific history of injury or trauma. Patient reportedly may have had a foreign body to the digit but is uncertain of the specific incident. He works as an  and is exposed to fine particles throughout his work days. Because of the increased pain and swelling of the initial onset he was treated with a course of antibiotics consisting of Keflex as well as a course of steroids and noted that there was initially some improvement of the swelling. Recently had increased swelling and had administration of Rocephin noting no significant change in clinical signs. Remaining past medical history was noncontributory. He has no known history of gout or rheumatoid arthritis. No past medical history on file. Past Surgical History:   Procedure Laterality Date    COLONOSCOPY  11/01/2019      Allergies: Patient has no known allergies. Current Outpatient Medications   Medication Sig Dispense Refill    omeprazole (PRILOSEC) 20 MG delayed release capsule Take 20 mg by mouth daily      MEGARED OMEGA-3 KRILL OIL PO Take by mouth      varenicline (CHANTIX) 1 MG tablet Take 1 tablet by mouth 2 times daily (Patient not taking: Reported on 10/1/2019) 60 tablet 5     No current facility-administered medications for this visit. Social History     Tobacco Use    Smoking status: Current Every Day Smoker    Smokeless tobacco: Never Used   Substance Use Topics    Alcohol use: Not on file     Comment: occsionally        Review of Systems:  Notes no numbness or tingling.   Other than stated above in the HPI is negative      Physical exam:     General appearance: no acute distress  Head: NAD  Neck: supple  Lungs: No audible wheezing, respiratory rate normal  Heart: Perfusion to all extremeties  Extremities: Examination of his right hand revealed significant localized swelling to the middle phalanx with no skin changes. He was nontender to flexion sheath. With attempted flexion he was able to come within 3 cm of the distal palmar flexion crease. He was assessed to be neurovascular intact. Assessment:  Patient presents with probable signs consistent with a foreign body reaction to his right index finger at the level of P2.        Plan:  Recommendation was surgical excisional debridement and irrigation. Wished to proceed with surgical treatment given the refractory symptoms. No orders of the defined types were placed in this encounter.               JEANA BRAND MD 2/17/2021 at 7:40 PM

## 2021-02-19 ENCOUNTER — HOSPITAL ENCOUNTER (OUTPATIENT)
Dept: PREADMISSION TESTING | Age: 56
Setting detail: SPECIMEN
Discharge: HOME | End: 2021-02-23
Payer: COMMERCIAL

## 2021-02-19 DIAGNOSIS — Z11.59 ENCOUNTER FOR SCREENING FOR OTHER VIRAL DISEASES: Primary | ICD-10-CM

## 2021-02-19 PROCEDURE — U0005 INFEC AGEN DETEC AMPLI PROBE: HCPCS

## 2021-02-19 PROCEDURE — U0003 INFECTIOUS AGENT DETECTION BY NUCLEIC ACID (DNA OR RNA); SEVERE ACUTE RESPIRATORY SYNDROME CORONAVIRUS 2 (SARS-COV-2) (CORONAVIRUS DISEASE [COVID-19]), AMPLIFIED PROBE TECHNIQUE, MAKING USE OF HIGH THROUGHPUT TECHNOLOGIES AS DESCRIBED BY CMS-2020-01-R: HCPCS

## 2021-02-21 LAB
SARS-COV-2: NORMAL
SARS-COV-2: NOT DETECTED
SOURCE: NORMAL

## 2021-02-24 ENCOUNTER — ANESTHESIA EVENT (OUTPATIENT)
Dept: OPERATING ROOM | Age: 56
End: 2021-02-24
Payer: COMMERCIAL

## 2021-02-24 ENCOUNTER — ANESTHESIA (OUTPATIENT)
Dept: OPERATING ROOM | Age: 56
End: 2021-02-24
Payer: COMMERCIAL

## 2021-02-24 ENCOUNTER — HOSPITAL ENCOUNTER (OUTPATIENT)
Age: 56
Setting detail: OUTPATIENT SURGERY
Discharge: HOME | End: 2021-02-24
Attending: ORTHOPAEDIC SURGERY | Admitting: ORTHOPAEDIC SURGERY
Payer: COMMERCIAL

## 2021-02-24 VITALS
RESPIRATION RATE: 8 BRPM | DIASTOLIC BLOOD PRESSURE: 62 MMHG | TEMPERATURE: 97.9 F | SYSTOLIC BLOOD PRESSURE: 131 MMHG | OXYGEN SATURATION: 96 %

## 2021-02-24 VITALS
OXYGEN SATURATION: 97 % | BODY MASS INDEX: 25.2 KG/M2 | SYSTOLIC BLOOD PRESSURE: 124 MMHG | RESPIRATION RATE: 16 BRPM | HEART RATE: 50 BPM | HEIGHT: 71 IN | WEIGHT: 180 LBS | DIASTOLIC BLOOD PRESSURE: 83 MMHG | TEMPERATURE: 97.5 F

## 2021-02-24 DIAGNOSIS — M65.10 INFECTION OF FLEXOR TENDON SHEATH: Primary | ICD-10-CM

## 2021-02-24 LAB
DIRECT EXAM: NORMAL
Lab: NORMAL
SPECIMEN DESCRIPTION: NORMAL

## 2021-02-24 PROCEDURE — 3700000000 HC ANESTHESIA ATTENDED CARE: Performed by: ORTHOPAEDIC SURGERY

## 2021-02-24 PROCEDURE — 2500000003 HC RX 250 WO HCPCS: Performed by: NURSE ANESTHETIST, CERTIFIED REGISTERED

## 2021-02-24 PROCEDURE — 2580000003 HC RX 258: Performed by: ORTHOPAEDIC SURGERY

## 2021-02-24 PROCEDURE — 87205 SMEAR GRAM STAIN: CPT

## 2021-02-24 PROCEDURE — 2709999900 HC NON-CHARGEABLE SUPPLY: Performed by: ORTHOPAEDIC SURGERY

## 2021-02-24 PROCEDURE — 3700000001 HC ADD 15 MINUTES (ANESTHESIA): Performed by: ORTHOPAEDIC SURGERY

## 2021-02-24 PROCEDURE — 87075 CULTR BACTERIA EXCEPT BLOOD: CPT

## 2021-02-24 PROCEDURE — 6360000002 HC RX W HCPCS: Performed by: NURSE ANESTHETIST, CERTIFIED REGISTERED

## 2021-02-24 PROCEDURE — 3600000012 HC SURGERY LEVEL 2 ADDTL 15MIN: Performed by: ORTHOPAEDIC SURGERY

## 2021-02-24 PROCEDURE — 6360000002 HC RX W HCPCS: Performed by: ORTHOPAEDIC SURGERY

## 2021-02-24 PROCEDURE — 26020 DRAIN HAND TENDON SHEATH: CPT | Performed by: ORTHOPAEDIC SURGERY

## 2021-02-24 PROCEDURE — 3600000002 HC SURGERY LEVEL 2 BASE: Performed by: ORTHOPAEDIC SURGERY

## 2021-02-24 PROCEDURE — 87206 SMEAR FLUORESCENT/ACID STAI: CPT

## 2021-02-24 PROCEDURE — 7100000010 HC PHASE II RECOVERY - FIRST 15 MIN: Performed by: ORTHOPAEDIC SURGERY

## 2021-02-24 PROCEDURE — 87070 CULTURE OTHR SPECIMN AEROBIC: CPT

## 2021-02-24 PROCEDURE — 2500000003 HC RX 250 WO HCPCS: Performed by: ORTHOPAEDIC SURGERY

## 2021-02-24 PROCEDURE — 7100000011 HC PHASE II RECOVERY - ADDTL 15 MIN: Performed by: ORTHOPAEDIC SURGERY

## 2021-02-24 RX ORDER — PROPOFOL 10 MG/ML
INJECTION, EMULSION INTRAVENOUS PRN
Status: DISCONTINUED | OUTPATIENT
Start: 2021-02-24 | End: 2021-02-24

## 2021-02-24 RX ORDER — SODIUM CHLORIDE, SODIUM LACTATE, POTASSIUM CHLORIDE, CALCIUM CHLORIDE 600; 310; 30; 20 MG/100ML; MG/100ML; MG/100ML; MG/100ML
INJECTION, SOLUTION INTRAVENOUS CONTINUOUS
Status: DISCONTINUED | OUTPATIENT
Start: 2021-02-24 | End: 2021-02-24 | Stop reason: HOSPADM

## 2021-02-24 RX ORDER — TRAMADOL HYDROCHLORIDE 50 MG/1
50-100 TABLET ORAL EVERY 6 HOURS PRN
Qty: 30 TABLET | Refills: 0 | Status: SHIPPED | OUTPATIENT
Start: 2021-02-24 | End: 2021-03-10

## 2021-02-24 RX ORDER — MORPHINE SULFATE 2 MG/ML
1 INJECTION, SOLUTION INTRAMUSCULAR; INTRAVENOUS EVERY 5 MIN PRN
Status: DISCONTINUED | OUTPATIENT
Start: 2021-02-24 | End: 2021-02-24 | Stop reason: HOSPADM

## 2021-02-24 RX ORDER — MIDAZOLAM HYDROCHLORIDE 1 MG/ML
INJECTION INTRAMUSCULAR; INTRAVENOUS PRN
Status: DISCONTINUED | OUTPATIENT
Start: 2021-02-24 | End: 2021-02-24 | Stop reason: SDUPTHER

## 2021-02-24 RX ORDER — PROPOFOL 10 MG/ML
INJECTION, EMULSION INTRAVENOUS CONTINUOUS PRN
Status: DISCONTINUED | OUTPATIENT
Start: 2021-02-24 | End: 2021-02-24 | Stop reason: SDUPTHER

## 2021-02-24 RX ORDER — HYDROCODONE BITARTRATE AND ACETAMINOPHEN 5; 325 MG/1; MG/1
2 TABLET ORAL PRN
Status: DISCONTINUED | OUTPATIENT
Start: 2021-02-24 | End: 2021-02-24 | Stop reason: HOSPADM

## 2021-02-24 RX ORDER — FENTANYL CITRATE 50 UG/ML
INJECTION, SOLUTION INTRAMUSCULAR; INTRAVENOUS PRN
Status: DISCONTINUED | OUTPATIENT
Start: 2021-02-24 | End: 2021-02-24 | Stop reason: SDUPTHER

## 2021-02-24 RX ORDER — LIDOCAINE HYDROCHLORIDE 20 MG/ML
INJECTION, SOLUTION INFILTRATION; PERINEURAL PRN
Status: DISCONTINUED | OUTPATIENT
Start: 2021-02-24 | End: 2021-02-24 | Stop reason: SDUPTHER

## 2021-02-24 RX ORDER — HYDROCODONE BITARTRATE AND ACETAMINOPHEN 5; 325 MG/1; MG/1
1 TABLET ORAL PRN
Status: DISCONTINUED | OUTPATIENT
Start: 2021-02-24 | End: 2021-02-24 | Stop reason: HOSPADM

## 2021-02-24 RX ORDER — DOXYCYCLINE HYCLATE 100 MG
100 TABLET ORAL 2 TIMES DAILY
Qty: 20 TABLET | Refills: 0 | Status: SHIPPED | OUTPATIENT
Start: 2021-02-24 | End: 2021-03-06

## 2021-02-24 RX ORDER — ONDANSETRON 2 MG/ML
4 INJECTION INTRAMUSCULAR; INTRAVENOUS
Status: DISCONTINUED | OUTPATIENT
Start: 2021-02-24 | End: 2021-02-24 | Stop reason: HOSPADM

## 2021-02-24 RX ADMIN — LIDOCAINE HYDROCHLORIDE 100 MG: 20 INJECTION, SOLUTION INFILTRATION; PERINEURAL at 14:18

## 2021-02-24 RX ADMIN — MIDAZOLAM HYDROCHLORIDE 2 MG: 1 INJECTION, SOLUTION INTRAMUSCULAR; INTRAVENOUS at 14:09

## 2021-02-24 RX ADMIN — PROPOFOL 200 MCG/KG/MIN: 10 INJECTION, EMULSION INTRAVENOUS at 14:18

## 2021-02-24 RX ADMIN — FENTANYL CITRATE 100 MCG: 50 INJECTION, SOLUTION INTRAMUSCULAR; INTRAVENOUS at 14:14

## 2021-02-24 RX ADMIN — SODIUM CHLORIDE, POTASSIUM CHLORIDE, SODIUM LACTATE AND CALCIUM CHLORIDE: 600; 310; 30; 20 INJECTION, SOLUTION INTRAVENOUS at 13:17

## 2021-02-24 RX ADMIN — Medication 2 G: at 14:19

## 2021-02-24 RX ADMIN — SODIUM CHLORIDE, POTASSIUM CHLORIDE, SODIUM LACTATE AND CALCIUM CHLORIDE: 600; 310; 30; 20 INJECTION, SOLUTION INTRAVENOUS at 14:41

## 2021-02-24 ASSESSMENT — LIFESTYLE VARIABLES: SMOKING_STATUS: 1

## 2021-02-24 ASSESSMENT — PAIN SCALES - GENERAL
PAINLEVEL_OUTOF10: 0

## 2021-02-24 NOTE — OP NOTE
Operative Note      Patient: Sho Epperson  YOB: 1965  MRN: [id number]    Date of Procedure: 2/24/2021    Pre-Op Diagnosis: right index finger foreign body    Post-Op Diagnosis: Infection flexor tendon sheath right index finger       Procedure(s):  INCISION AND DRAINAGE TENDON SHEATH RIGHT FINGER    Surgeon(s):  Shasha Li MD    Assistant:   None  Anesthesia: Monitor Anesthesia Care    Estimated Blood Loss (mL): Minimal    Complications: None    Specimens:   ID Type Source Tests Collected by Time Destination   1 : RIGHT INDEX FINGER Swab Finger GRAM STAIN, FUNGAL STAIN, CULTURE, ANAEROBIC AND AEROBIC Shasha Li MD 2/24/2021 1431        Implants:  None  Drains: None    Findings: Significant purulent fluid along the ulnar aspect of the middle phalanx. There appear to be communication to the flexor tendon sheath. With palpation directly to the flexor tendon sheath clear serous fluid was milked from deep within. No foreign body was identified. Detailed Description of Procedure:   Under monitored anesthesia care, patient right upper extremity was prepped and draped for the proposed procedure. Digital block was established with a solution of 0.5% Marcaine/1% Xylocaine. Tourniquet placed right proximal humerus with inflated 250mmHg following exsanguination of the limb with an application of an Esmarch bandage. Lateral incision was made along the ulnar aspect of the index finger at the level of the middle phalanx. Upon making incision there was purulent fluid. Cultures were taken at this time for microbiological studies. The wound was deepened and the region expressed of all its collected pus. The neurovascular bundle was identified and preserved. Noted upon further dissection was that there was communication to the tendon with violation of the tendon sheath. With palpation directly applied to the tendon sheath clear serous fluid was noted.   This appeared to be a greater degree of fluid than 1 would expect. At this time a thorough irrigation was performed with use of normal saline solution. The region was probed thoroughly noting no evidence of any residual or foreign body presence. Wound was loosely approximated with a 4-0 nylon service in a simple suture fashion. Sterile dressing was applied. Patient will be discharged with a course of antibiotics and close follow-up.   Electronically signed by Megan Richards MD on 2/24/2021 at 5:12 PM

## 2021-02-24 NOTE — H&P
History and Physical     Patient's Name/Date of Birth: Dayne Ying / 1965, 54 y.o. yo     Date: February 17, 2021      PCP: No primary care provider on file. History of Present Illness:     This 80-year-old right-hand dominant male presents with them nearly 6-week history of localized swelling to his right index finger with no known specific history of injury or trauma.     Patient reportedly may have had a foreign body to the digit but is uncertain of the specific incident. He works as an  and is exposed to fine particles throughout his work days.     Because of the increased pain and swelling of the initial onset he was treated with a course of antibiotics consisting of Keflex as well as a course of steroids and noted that there was initially some improvement of the swelling.     Recently had increased swelling and had administration of Rocephin noting no significant change in clinical signs.     Remaining past medical history was noncontributory. He has no known history of gout or rheumatoid arthritis.        Past Medical History   No past medical history on file. Past Surgical History         Past Surgical History:   Procedure Laterality Date    COLONOSCOPY   11/01/2019         Allergies: Patient has no known allergies.      Current Facility-Administered Medications          Current Outpatient Medications   Medication Sig Dispense Refill    omeprazole (PRILOSEC) 20 MG delayed release capsule Take 20 mg by mouth daily        MEGARED OMEGA-3 KRILL OIL PO Take by mouth        varenicline (CHANTIX) 1 MG tablet Take 1 tablet by mouth 2 times daily (Patient not taking: Reported on 10/1/2019) 60 tablet 5      No current facility-administered medications for this visit.            Social History            Tobacco Use    Smoking status: Current Every Day Smoker    Smokeless tobacco: Never Used   Substance Use Topics    Alcohol use: Not on file       Comment: occsionally    Review of Systems:  Notes no numbness or tingling. Other than stated above in the HPI is negative        Physical exam:      General appearance: no acute distress  Head: NAD  Neck: supple  Lungs: No audible wheezing, respiratory rate normal  Heart: Perfusion to all extremeties  Extremities: Examination of his right hand revealed significant localized swelling to the middle phalanx with no skin changes. He was nontender to flexion sheath. With attempted flexion he was able to come within 3 cm of the distal palmar flexion crease. He was assessed to be neurovascular intact.     Assessment:  Patient presents with probable signs consistent with a foreign body reaction to his right index finger at the level of P2.           Plan:  Recommendation was surgical excisional debridement and irrigation. Wished to proceed with surgical treatment given the refractory symptoms. No orders of the defined types were placed in this encounter.                    JEANA BRAND MD 2/17/2021 at 7:40 PM    H&P reviewed and patient examined, no changes noted. I have reviewed the risks, benefits and alternatives to the procedure.   Electronically signed by Lakeisha Kumar MD on 2/24/2021 at 1:10 PM

## 2021-02-24 NOTE — ANESTHESIA PRE PROCEDURE
Department of Anesthesiology  Preprocedure Note       Name:  James Tavarez   Age:  54 y.o.  :  1965                                          MRN:  [id number]         Date:  2021      Surgeon: Cesar Cameron):  Karina Dodge MD    Procedure: Procedure(s):  Excisional DEBRIDEMENT Right Index Finger    Medications prior to admission:   Prior to Admission medications    Medication Sig Start Date End Date Taking? Authorizing Provider   MACARIO OMEGA-3 KRILL OIL PO Take by mouth   Yes Historical Provider, MD   omeprazole (PRILOSEC) 20 MG delayed release capsule Take 20 mg by mouth daily    Historical Provider, MD   varenicline (CHANTIX) 1 MG tablet Take 1 tablet by mouth 2 times daily  Patient not taking: Reported on 10/1/2019 5/23/19   Cristo Sue MD       Current medications:    Current Facility-Administered Medications   Medication Dose Route Frequency Provider Last Rate Last Admin    lactated ringers infusion   Intravenous Continuous Karina Dodge  mL/hr at 21 1317 New Bag at 21 1317       Allergies:  No Known Allergies    Problem List:    Patient Active Problem List   Diagnosis Code    Tobacco abuse Z72.0       Past Medical History:  History reviewed. No pertinent past medical history.     Past Surgical History:        Procedure Laterality Date    COLONOSCOPY  2019       Social History:    Social History     Tobacco Use    Smoking status: Current Every Day Smoker    Smokeless tobacco: Never Used   Substance Use Topics    Alcohol use: Not on file     Comment: occsionally                                Ready to quit: Not Answered  Counseling given: Not Answered      Vital Signs (Current):   Vitals:    21 1302   BP: 126/80   Pulse: 75   Resp: 16   Temp: 36.6 °C (97.9 °F)   TempSrc: Tympanic   SpO2: 97%   Weight: 180 lb (81.6 kg)   Height: 5' 11\" (1.803 m)                                              BP Readings from Last 3 Encounters:   21 126/80 02/17/21 136/84   02/01/21 124/87       NPO Status: Time of last liquid consumption: 2200                        Time of last solid consumption: 2200                        Date of last liquid consumption: 02/23/21                        Date of last solid food consumption: 02/23/21    BMI:   Wt Readings from Last 3 Encounters:   02/24/21 180 lb (81.6 kg)   02/17/21 173 lb (78.5 kg)   02/01/21 173 lb (78.5 kg)     Body mass index is 25.1 kg/m². CBC:   Lab Results   Component Value Date    WBC 11.7 02/01/2021    RBC 5.06 02/01/2021    HGB 15.7 02/01/2021    HCT 48.2 02/01/2021    MCV 95.3 02/01/2021    RDW 12.5 02/01/2021     02/01/2021       CMP:   Lab Results   Component Value Date     04/25/2019    K 4.2 04/25/2019     04/25/2019    CO2 27 04/25/2019    BUN 10 04/25/2019    CREATININE 0.76 04/25/2019    GFRAA >60 04/25/2019    LABGLOM >60 04/25/2019    GLUCOSE 107 04/25/2019    CALCIUM 9.2 04/25/2019       POC Tests: No results for input(s): POCGLU, POCNA, POCK, POCCL, POCBUN, POCHEMO, POCHCT in the last 72 hours.     Coags: No results found for: PROTIME, INR, APTT    HCG (If Applicable): No results found for: PREGTESTUR, PREGSERUM, HCG, HCGQUANT     ABGs: No results found for: PHART, PO2ART, GCE5BXO, DER9LNC, BEART, D3WCQFRG     Type & Screen (If Applicable):  No results found for: LABABO, LABRH    Drug/Infectious Status (If Applicable):  No results found for: HIV, HEPCAB    COVID-19 Screening (If Applicable):   Lab Results   Component Value Date    COVID19 Not Detected 02/19/2021         Anesthesia Evaluation  Patient summary reviewed and Nursing notes reviewed no history of anesthetic complications:   Airway: Mallampati: II  TM distance: >3 FB   Neck ROM: full  Mouth opening: > = 3 FB Dental: normal exam         Pulmonary:normal exam    (+) current smoker                           Cardiovascular:                Echocardiogram reviewed                  Neuro/Psych:   Negative Neuro/Psych ROS              GI/Hepatic/Renal:   (+) GERD:,           Endo/Other:                     Abdominal:           Vascular: negative vascular ROS. Anesthesia Plan      MAC     ASA 2       Induction: intravenous. Anesthetic plan and risks discussed with patient.       Plan discussed with surgical team.                  MIRYAM Dos Santos - BRIANA   2/24/2021

## 2021-02-24 NOTE — ANESTHESIA POSTPROCEDURE EVALUATION
Department of Anesthesiology  Postprocedure Note    Patient: Leah Dalton  MRN: [id number]  YOB: 1965  Date of evaluation: 2/24/2021  Time:  2:56 PM     Procedure Summary     Date: 02/24/21 Room / Location: [address] [place] [address]    Anesthesia Start: 1409 Anesthesia Stop: 9156    Procedure: INCISION AND DRAINAGE TENDON SHEATH RIGHT FINGER (Right ) Diagnosis: (right index finger foreign body)    Surgeons: Wil Kaur MD Responsible Provider: MIRYAM Ward CRNA    Anesthesia Type: MAC ASA Status: 2          Anesthesia Type: MAC    Shukri Phase I: Shukri Score: 10    Shukri Phase II:      Last vitals: Reviewed and per EMR flowsheets.        Anesthesia Post Evaluation    Patient location during evaluation: PACU  Patient participation: complete - patient participated  Level of consciousness: awake and alert  Pain score: 0  Airway patency: patent  Nausea & Vomiting: no nausea and no vomiting  Complications: no  Cardiovascular status: blood pressure returned to baseline and hemodynamically stable  Respiratory status: acceptable, spontaneous ventilation and room air  Hydration status: euvolemic

## 2021-02-25 LAB
DIRECT EXAM: NORMAL
Lab: NORMAL
SPECIMEN DESCRIPTION: NORMAL

## 2021-03-01 LAB
CULTURE: NORMAL
DIRECT EXAM: NORMAL
DIRECT EXAM: NORMAL
Lab: NORMAL
SPECIMEN DESCRIPTION: NORMAL

## 2021-03-10 ENCOUNTER — OFFICE VISIT (OUTPATIENT)
Dept: ORTHOPEDIC SURGERY | Age: 56
End: 2021-03-10
Payer: COMMERCIAL

## 2021-03-10 VITALS
SYSTOLIC BLOOD PRESSURE: 125 MMHG | WEIGHT: 180 LBS | HEART RATE: 73 BPM | DIASTOLIC BLOOD PRESSURE: 89 MMHG | BODY MASS INDEX: 25.1 KG/M2

## 2021-03-10 DIAGNOSIS — M79.89 FINGER SWELLING: Primary | ICD-10-CM

## 2021-03-10 PROCEDURE — 99024 POSTOP FOLLOW-UP VISIT: CPT | Performed by: ORTHOPAEDIC SURGERY

## 2021-03-10 RX ORDER — INDOMETHACIN 25 MG/1
25 CAPSULE ORAL 3 TIMES DAILY
Qty: 60 CAPSULE | Refills: 1 | Status: SHIPPED | OUTPATIENT
Start: 2021-03-10 | End: 2022-03-24

## 2021-03-10 NOTE — PROGRESS NOTES
History and Physical    Patient's Name/Date of Birth: Veronica Perez / 1965, 54 y.o. yo    Date: March 10, 2021     PCP: No primary care provider on file. History of Present Illness: This 51-year-old male is now seen 2 weeks status post excisional drainage of the tendon sheath of the right index finger.,  Date of surgery was 2/24 8/21. At time of surgery there was definite purulent fluid that communicated with the tendon sheath. Is been discharged on a course of doxycycline which he just completed. Noticed that the patient still has a fair degree of swelling localized to the middle phalanx of the index finger with limited flexion. There however is no significant pain. There has been no further discharge. No past medical history on file. Past Surgical History:   Procedure Laterality Date    COLONOSCOPY  11/01/2019    HAND SURGERY Right 2/24/2021    INCISION AND DRAINAGE TENDON SHEATH RIGHT FINGER performed by John Mccabe MD at [address]: Patient has no known allergies. Current Outpatient Medications   Medication Sig Dispense Refill    indomethacin (INDOCIN) 25 MG capsule Take 1 capsule by mouth 3 times daily for 120 doses 60 capsule 1    omeprazole (PRILOSEC) 20 MG delayed release capsule Take 20 mg by mouth daily      varenicline (CHANTIX) 1 MG tablet Take 1 tablet by mouth 2 times daily 60 tablet 5    MEGARED OMEGA-3 KRILL OIL PO Take by mouth       No current facility-administered medications for this visit.         Social History     Tobacco Use    Smoking status: Current Every Day Smoker    Smokeless tobacco: Never Used   Substance Use Topics    Alcohol use: Not on file     Comment: occsionally        Review of Systems:  No numbness or tingling  Other than stated above in the HPI is negative      Physical exam:     General appearance: no acute distress  Head: NAD  Neck: supple  Lungs: No audible wheezing, respiratory rate normal  Heart: Perfusion to all extremeties  Extremities: Examination of the right index finger reveals fair degree of swelling localized to the middle phalanx. There was no erythema or discharge. He has limited flexion able to come within 5 cm of the distal palmar flexion crease. He was assessed to be neurovascular intact. Assessment:  Postop edema following incisional drainage of the right index finger. Possibility of gout was entertained. Plan:  Recommendation was a trial of Indocin 25 mg 3 times daily and continue with range of motion of the digit. Patient will be reassessed again the following week. No orders of the defined types were placed in this encounter.               JEANA BRAND MD 3/10/2021 at 10:10 AM

## 2021-07-19 ENCOUNTER — TELEMEDICINE (OUTPATIENT)
Dept: FAMILY MEDICINE CLINIC | Age: 56
End: 2021-07-19
Payer: COMMERCIAL

## 2021-07-19 DIAGNOSIS — Z00.00 ROUTINE HEALTH MAINTENANCE: Primary | ICD-10-CM

## 2021-07-19 DIAGNOSIS — Z11.4 ENCOUNTER FOR SCREENING FOR HIV: ICD-10-CM

## 2021-07-19 DIAGNOSIS — Z11.59 NEED FOR HEPATITIS C SCREENING TEST: ICD-10-CM

## 2021-07-19 DIAGNOSIS — Z12.5 SCREENING FOR PROSTATE CANCER: ICD-10-CM

## 2021-07-19 DIAGNOSIS — Z13.220 SCREENING FOR LIPID DISORDERS: ICD-10-CM

## 2021-07-19 DIAGNOSIS — F17.200 SMOKING ADDICTION: ICD-10-CM

## 2021-07-19 PROCEDURE — 99396 PREV VISIT EST AGE 40-64: CPT | Performed by: NURSE PRACTITIONER

## 2021-07-19 RX ORDER — VARENICLINE TARTRATE 1 MG/1
1 TABLET, FILM COATED ORAL 2 TIMES DAILY
Qty: 60 TABLET | Refills: 5 | Status: SHIPPED | OUTPATIENT
Start: 2021-07-19 | End: 2022-03-24

## 2021-07-19 RX ORDER — VARENICLINE TARTRATE
KIT
Qty: 1 BOX | Refills: 0 | Status: SHIPPED | OUTPATIENT
Start: 2021-07-19 | End: 2022-03-24

## 2021-07-19 SDOH — ECONOMIC STABILITY: FOOD INSECURITY: WITHIN THE PAST 12 MONTHS, THE FOOD YOU BOUGHT JUST DIDN'T LAST AND YOU DIDN'T HAVE MONEY TO GET MORE.: NEVER TRUE

## 2021-07-19 SDOH — ECONOMIC STABILITY: FOOD INSECURITY: WITHIN THE PAST 12 MONTHS, YOU WORRIED THAT YOUR FOOD WOULD RUN OUT BEFORE YOU GOT MONEY TO BUY MORE.: NEVER TRUE

## 2021-07-19 ASSESSMENT — ENCOUNTER SYMPTOMS
GASTROINTESTINAL NEGATIVE: 1
SHORTNESS OF BREATH: 0
COUGH: 0

## 2021-07-19 ASSESSMENT — PATIENT HEALTH QUESTIONNAIRE - PHQ9
2. FEELING DOWN, DEPRESSED OR HOPELESS: 0
SUM OF ALL RESPONSES TO PHQ QUESTIONS 1-9: 0
SUM OF ALL RESPONSES TO PHQ9 QUESTIONS 1 & 2: 0
SUM OF ALL RESPONSES TO PHQ QUESTIONS 1-9: 0
SUM OF ALL RESPONSES TO PHQ QUESTIONS 1-9: 0
1. LITTLE INTEREST OR PLEASURE IN DOING THINGS: 0

## 2021-07-19 ASSESSMENT — SOCIAL DETERMINANTS OF HEALTH (SDOH): HOW HARD IS IT FOR YOU TO PAY FOR THE VERY BASICS LIKE FOOD, HOUSING, MEDICAL CARE, AND HEATING?: NOT HARD AT ALL

## 2021-07-19 NOTE — PROGRESS NOTES
Joselito Cotter (:  1965) is a 64 y.o. male,Established patient, here for evaluation of the following chief complaint(s): New Patient (New to [facility]. Former Dr. Ingram Every Patient.)         ASSESSMENT/PLAN:  1. Routine health maintenance  -     Lipid Panel; Future  -     Comprehensive Metabolic Panel; Future  -     CBC With Auto Differential; Future  -     PSA Screening; Future  -     Urinalysis With Microscopic; Future  2. Smoking addiction  -     varenicline (CHANTIX) 1 MG tablet; Take 1 tablet by mouth 2 times daily, Disp-60 tablet, R-5Normal  -     varenicline (CHANTIX STARTING MONTH ELISE) 0.5 MG X 11 & 1 MG X 42 tablet; Take by mouth., Disp-1 box, R-0Normal  3. Screening for prostate cancer  -     PSA Screening; Future  4. Screening for lipid disorders  -     Lipid Panel; Future  5. Need for hepatitis C screening test  -     Hepatitis C Antibody; Future  6. Encounter for screening for HIV  -     HIV Screen; Future      Return in about 1 year (around 2022), or if symptoms worsen or fail to improve. SUBJECTIVE/OBJECTIVE:  HPI  Patient presents today for a wellness visit and to request Chantix to help him to quit smoking. He states overall he is feeling well. He is open to getting wellness labs done. We discussed vaccines he is due for. Review of Systems   Constitutional: Negative. HENT: Negative. Respiratory: Negative for cough and shortness of breath. Cardiovascular: Negative. Gastrointestinal: Negative. Genitourinary: Negative. Musculoskeletal: Negative. Skin: Negative. Neurological: Negative. Hematological: Negative. Psychiatric/Behavioral: Negative.         Patient-Reported Vitals 2021   Patient-Reported Height 5'11\"        Physical Exam    [INSTRUCTIONS:  \"[x]\" Indicates a positive item  \"[]\" Indicates a negative item  -- DELETE ALL ITEMS NOT EXAMINED]    Constitutional: [x] Appears well-developed and well-nourished [x] No apparent distress [] Abnormal -     Mental status: [x] Alert and awake  [x] Oriented to person/place/time [x] Able to follow commands    [] Abnormal -     Eyes:   EOM    [x]  Normal    [] Abnormal -   Sclera  [x]  Normal    [] Abnormal -          Discharge [x]  None visible   [] Abnormal -     HENT: [x] Normocephalic, atraumatic  [] Abnormal -   [x] Mouth/Throat: Mucous membranes are moist    External Ears [x] Normal  [] Abnormal -    Neck: [x] No visualized mass [] Abnormal -     Pulmonary/Chest: [x] Respiratory effort normal   [x] No visualized signs of difficulty breathing or respiratory distress        [] Abnormal -      Musculoskeletal:   [x] Normal gait with no signs of ataxia         [x] Normal range of motion of neck        [] Abnormal -     Neurological:        [x] No Facial Asymmetry (Cranial nerve 7 motor function) (limited exam due to video visit)          [x] No gaze palsy        [] Abnormal -          Skin:        [x] No significant exanthematous lesions or discoloration noted on facial skin         [] Abnormal -            Psychiatric:       [x] Normal Affect [] Abnormal -        [x] No Hallucinations    Other pertinent observable physical exam findings:-          On this date 7/19/2021 I have spent 20 minutes reviewing previous notes, test results and face to face (virtual) with the patient discussing the diagnosis and importance of compliance with the treatment plan as well as documenting on the day of the visitAmerica Jeremi Carusonatalia, was evaluated through a synchronous (real-time) audio-video encounter. The patient (or guardian if applicable) is aware that this is a billable service. Verbal consent to proceed has been obtained within the past 12 months. The visit was conducted pursuant to the emergency declaration under the [facility], [address] [place] [address] authority and the [email or web address] General Act.   Patient identification was verified, and a caregiver was present when appropriate. The patient was located in a state where the provider was credentialed to provide care. An electronic signature was used to authenticate this note.     --MIRYAM Fuentes Do - CNP

## 2021-08-17 ENCOUNTER — TELEPHONE (OUTPATIENT)
Dept: FAMILY MEDICINE CLINIC | Age: 56
End: 2021-08-17

## 2021-08-17 DIAGNOSIS — F17.200 SMOKING ADDICTION: Primary | ICD-10-CM

## 2021-08-17 NOTE — TELEPHONE ENCOUNTER
Wife called and said chantix has been recalled, she wants to know if you can prescribe something else for the pt like Wellbutrin. Please advise.

## 2021-08-18 RX ORDER — BUPROPION HYDROCHLORIDE 150 MG/1
TABLET, EXTENDED RELEASE ORAL
Qty: 60 TABLET | Refills: 3 | Status: SHIPPED | OUTPATIENT
Start: 2021-08-18 | End: 2022-03-24

## 2022-03-03 ENCOUNTER — TELEPHONE (OUTPATIENT)
Dept: FAMILY MEDICINE CLINIC | Age: 57
End: 2022-03-03

## 2022-03-03 NOTE — TELEPHONE ENCOUNTER
Patient last had VV in July 2021 with Christos Payne and was given rx for chantix. It was recalled. Now would like to try again because he is really smoking a lot and has gotten a cough and wants to try to quit. Can something be called in? He will be getting established soon with a provider once he knows his work schedule. The wellbutrin did not help. He uses [employer] [employer].     [facility] sent refill request for APO-VARENICLINE 0.5 mg tablet

## 2022-03-03 NOTE — TELEPHONE ENCOUNTER
I am glad he is attempting to stop smoking. Patient has not been seen in the office recently so would recommend an in person visit to evaluate and initiate a new prescription.

## 2022-03-24 ENCOUNTER — OFFICE VISIT (OUTPATIENT)
Dept: FAMILY MEDICINE CLINIC | Age: 57
End: 2022-03-24
Payer: COMMERCIAL

## 2022-03-24 VITALS
BODY MASS INDEX: 23.89 KG/M2 | DIASTOLIC BLOOD PRESSURE: 88 MMHG | HEIGHT: 72 IN | SYSTOLIC BLOOD PRESSURE: 126 MMHG | OXYGEN SATURATION: 100 % | WEIGHT: 176.4 LBS | TEMPERATURE: 98.2 F | RESPIRATION RATE: 20 BRPM | HEART RATE: 73 BPM

## 2022-03-24 DIAGNOSIS — Z13.220 SCREENING FOR LIPOID DISORDERS: ICD-10-CM

## 2022-03-24 DIAGNOSIS — Z12.5 SCREENING FOR PROSTATE CANCER: ICD-10-CM

## 2022-03-24 DIAGNOSIS — F17.210 CIGARETTE NICOTINE DEPENDENCE, UNCOMPLICATED: ICD-10-CM

## 2022-03-24 DIAGNOSIS — Z87.891 PERSONAL HISTORY OF TOBACCO USE: ICD-10-CM

## 2022-03-24 DIAGNOSIS — N52.9 ERECTILE DYSFUNCTION, UNSPECIFIED ERECTILE DYSFUNCTION TYPE: ICD-10-CM

## 2022-03-24 DIAGNOSIS — D72.829 LEUKOCYTOSIS, UNSPECIFIED TYPE: ICD-10-CM

## 2022-03-24 DIAGNOSIS — L98.9 SKIN LESION: ICD-10-CM

## 2022-03-24 DIAGNOSIS — Z00.00 WELLNESS EXAMINATION: Primary | ICD-10-CM

## 2022-03-24 PROCEDURE — 99214 OFFICE O/P EST MOD 30 MIN: CPT | Performed by: FAMILY MEDICINE

## 2022-03-24 PROCEDURE — 99212 OFFICE O/P EST SF 10 MIN: CPT | Performed by: FAMILY MEDICINE

## 2022-03-24 PROCEDURE — G0296 VISIT TO DETERM LDCT ELIG: HCPCS | Performed by: FAMILY MEDICINE

## 2022-03-24 RX ORDER — VARENICLINE TARTRATE 1 MG/1
1 TABLET, FILM COATED ORAL 2 TIMES DAILY
Qty: 60 TABLET | Refills: 2 | Status: SHIPPED | OUTPATIENT
Start: 2022-03-24 | End: 2022-08-08 | Stop reason: SDUPTHER

## 2022-03-24 RX ORDER — SILDENAFIL 50 MG/1
50 TABLET, FILM COATED ORAL DAILY PRN
Qty: 30 TABLET | Refills: 0 | Status: SHIPPED | OUTPATIENT
Start: 2022-03-24 | End: 2022-08-08 | Stop reason: SDUPTHER

## 2022-03-24 SDOH — ECONOMIC STABILITY: FOOD INSECURITY: WITHIN THE PAST 12 MONTHS, THE FOOD YOU BOUGHT JUST DIDN'T LAST AND YOU DIDN'T HAVE MONEY TO GET MORE.: NEVER TRUE

## 2022-03-24 SDOH — ECONOMIC STABILITY: FOOD INSECURITY: WITHIN THE PAST 12 MONTHS, YOU WORRIED THAT YOUR FOOD WOULD RUN OUT BEFORE YOU GOT MONEY TO BUY MORE.: NEVER TRUE

## 2022-03-24 ASSESSMENT — ENCOUNTER SYMPTOMS
SHORTNESS OF BREATH: 0
WHEEZING: 0
PHOTOPHOBIA: 0
ABDOMINAL PAIN: 0
CHEST TIGHTNESS: 0

## 2022-03-24 ASSESSMENT — PATIENT HEALTH QUESTIONNAIRE - PHQ9
SUM OF ALL RESPONSES TO PHQ9 QUESTIONS 1 & 2: 0
2. FEELING DOWN, DEPRESSED OR HOPELESS: 0
1. LITTLE INTEREST OR PLEASURE IN DOING THINGS: 0
SUM OF ALL RESPONSES TO PHQ QUESTIONS 1-9: 0

## 2022-03-24 ASSESSMENT — SOCIAL DETERMINANTS OF HEALTH (SDOH): HOW HARD IS IT FOR YOU TO PAY FOR THE VERY BASICS LIKE FOOD, HOUSING, MEDICAL CARE, AND HEATING?: NOT HARD AT ALL

## 2022-03-24 NOTE — PATIENT INSTRUCTIONS
What is lung cancer screening? Lung cancer screening is a way in which doctors check the lungs for early signs of cancer in people who have no symptoms of lung cancer. A low-dose CT scan uses much less radiation than a normal CT scan and shows a more detailed image of the lungs than a standard X-ray. The goal of lung cancer screening is to find cancer early, before it has a chance to grow, spread, or cause problems. One large study found that smokers who were screened with low-dose CT scans were less likely to die of lung cancer than those who were screened with standard X-ray. Below is a summary of the things you need to know regarding screening for lung cancer with low-dose computed tomography (LDCT). This is a screening program that involves routine annual screening with LDCT studies of the lung. The LDCTs are done using low-dose radiation that is not thought to increase your cancer risk. If you have other serious medical conditions (other cancers, congestive heart failure) that limit your life expectancy to less than 10 years, you should not undergo lung cancer screening with LDCT. The chance is 20%-60% that the LDCT result will show abnormalities. This would require additional testing which could include repeat imaging or even invasive procedures. Most (about 95%) of \"abnormal\" LDCT results are false in the sense that no lung cancer is ultimately found. Additionally, some (about 10%) of the cancers found would not affect your life expectancy, even if undetected and untreated. If you are still smoking, the single most important thing that you can do to reduce your risk of dying of lung cancer is to quit. For this screening to be covered by Medicare and most other insurers, strict criteria must be met. If you do not meet these criteria, but still wish to undergo LDCT testing, you will be required to sign a waiver indicating your willingness to pay for the scan.     Sciona Education:    Keep hydrated, walk 30 minutes minimum 3 times weekly as tolerated. Diet should consist of low fat, low sodium and high fiber. Nutritious foods such as fruits (if you're not diabetic), vegetables, lean meats, lean dairy, whole grains such as brown rice, quinoa, and dry beans. Erneststephanie Haste with small amounts of heart healthy extra virgin olive oil. Be watchful of any extra salt/sugar/seasoning to your food. You should eat no more than 2 grams or 2,000 mg of salt daily. Salt will raise your BP. Avoid regular/diet sodas, caffeine, energy drinks as these are full of artificial ingredients/sweeteners, sugar, salt and chemicals that spike insulin and are harmful to your health. Sugar intake increases metabolic disfunction, type 2 diabetes, insulin resistance, addictive food behavior and obesity. Avoid all processed foods, foods from boxes, cans, microwave meals as these contain high salt, sugar or fat content and not much nutrition. Get at least 8 hrs of sleep every night and turn off all electronics at least 1 hour before bedtime as these decreases melatonin production and increases wakefulness. If your cholesterol is high, no greasy, fried, fast or fatty foods. Decrease red meat intake. No butter, haywood, lard or creams, no milk as these things clog your arteries and leads to heart attacks and death. If you smoke, smoking increases risk of lung disease, cancers, high BP, heart attack, stroke and death. Take your daily medications as prescribed and inform your family doctor if you are having any side effects or issues taking medications. Tobacco/Nicotine Review:    I discussed the difference between the I NEED A NICOTINE CIGARETTE and the DIRTY HABIT CIGARETTE. I explained how to wean off of tobacco/nicotine and stop.     Fasting blood work and physical exam within a month

## 2022-03-24 NOTE — PROGRESS NOTES
Name: Barin Strong  DOS: 3/24/2022  MRN: [id number]      Subjective:  Brain Strong is a 64 y.o. male being seen for   Chief Complaint   Patient presents with    Other     Establish care. Discuss tobacco abuse- requesting rx for Chantix. Also would like rx for Viagra. Vitals:    03/24/22 1414   BP: 126/88   Pulse:    Resp:    Temp:    SpO2:      No Known Allergies  No past medical history on file. Past Surgical History:   Procedure Laterality Date    COLONOSCOPY  11/01/2019    HAND SURGERY Right 2/24/2021    INCISION AND DRAINAGE TENDON SHEATH RIGHT FINGER performed by Crystal Crawley MD at [address] [place] [address] History     Socioeconomic History    Marital status: Single     Spouse name: None    Number of children: None    Years of education: None    Highest education level: None   Occupational History    None   Tobacco Use    Smoking status: Current Every Day Smoker     Packs/day: 2.50     Years: 30.00     Pack years: 75.00     Types: Cigarettes     Start date: 7/19/1991    Smokeless tobacco: Never Used   Substance and Sexual Activity    Alcohol use: None     Comment: occsionally    Drug use: None    Sexual activity: None   Other Topics Concern    None   Social History Narrative    None     Social Determinants of Health     Financial Resource Strain: Low Risk     Difficulty of Paying Living Expenses: Not hard at all   Food Insecurity: No Food Insecurity    Worried About Running Out of Food in the Last Year: Never true    Christian of Food in the Last Year: Never true   Transportation Needs:     Lack of Transportation (Medical): Not on file    Lack of Transportation (Non-Medical):  Not on file   Physical Activity:     Days of Exercise per Week: Not on file    Minutes of Exercise per Session: Not on file   Stress:     Feeling of Stress : Not on file   Social Connections:     Frequency of Communication with Friends and Family: Not on file    Frequency of Social Gatherings with Friends and Family: Not on file    Attends Anabaptism Services: Not on file    Active Member of Clubs or Organizations: Not on file    Attends Club or Organization Meetings: Not on file    Marital Status: Not on file   Intimate Partner Violence:     Fear of Current or Ex-Partner: Not on file    Emotionally Abused: Not on file    Physically Abused: Not on file    Sexually Abused: Not on file   Housing Stability:     Unable to Pay for Housing in the Last Year: Not on file    Number of [place] in the Last Year: Not on file    Unstable Housing in the Last Year: Not on file       Current Outpatient Medications   Medication Sig Dispense Refill    varenicline (CHANTIX) 1 MG tablet Take 1 tablet by mouth 2 times daily 60 tablet 2    sildenafil (VIAGRA) 50 MG tablet Take 1 tablet by mouth daily as needed for Erectile Dysfunction 30 tablet 0    omeprazole (PRILOSEC) 20 MG delayed release capsule Take 20 mg by mouth daily       No current facility-administered medications for this visit. Objective:  Pt feels good today. Smokes 2.5 ppd. Drinks two monsters a day. Review of Systems   Constitutional: Negative for fatigue and unexpected weight change. Eyes: Negative for photophobia and visual disturbance. Respiratory: Negative for chest tightness, shortness of breath and wheezing. Cardiovascular: Negative for chest pain, palpitations and leg swelling. Gastrointestinal: Negative for abdominal pain. Genitourinary: Negative for difficulty urinating and hematuria. Musculoskeletal: Negative for arthralgias and myalgias. Skin: Negative for rash and wound. Neurological: Negative for dizziness, tremors, seizures, syncope, speech difficulty, weakness, light-headedness, numbness and headaches. Hematological: Negative for adenopathy. Does not bruise/bleed easily.    Psychiatric/Behavioral: Negative for agitation, behavioral problems, confusion, decreased concentration, self-injury, sleep disturbance and suicidal ideas. The patient is not nervous/anxious and is not hyperactive. Physical Exam  Constitutional:       General: He is not in acute distress. Appearance: Normal appearance. He is not ill-appearing. HENT:      Head: Normocephalic and atraumatic. Right Ear: Tympanic membrane, ear canal and external ear normal. There is no impacted cerumen. Left Ear: Tympanic membrane, ear canal and external ear normal. There is no impacted cerumen. Nose: Nose normal.      Mouth/Throat:      Mouth: Mucous membranes are moist.      Pharynx: Oropharynx is clear. Eyes:      Extraocular Movements: Extraocular movements intact. Conjunctiva/sclera: Conjunctivae normal.      Pupils: Pupils are equal, round, and reactive to light. Cardiovascular:      Rate and Rhythm: Normal rate and regular rhythm. Pulses: Normal pulses. Heart sounds: Normal heart sounds. No murmur heard. Pulmonary:      Effort: Pulmonary effort is normal.      Breath sounds: Normal breath sounds. No wheezing, rhonchi or rales. Abdominal:      General: Abdomen is flat. Bowel sounds are normal. There is no distension. Palpations: Abdomen is soft. There is no mass. Tenderness: There is no abdominal tenderness. There is no guarding. Musculoskeletal:         General: Normal range of motion. Cervical back: Normal range of motion and neck supple. Right lower leg: No edema. Left lower leg: No edema. Lymphadenopathy:      Cervical: No cervical adenopathy. Skin:     General: Skin is warm. Capillary Refill: Capillary refill takes less than 2 seconds. Findings: Lesion (skin lesions of various brown color and some are rough on face and right hand ) present. Neurological:      General: No focal deficit present. Mental Status: He is alert and oriented to person, place, and time.       Gait: Gait normal.   Psychiatric:         Mood and Affect: Mood normal.         Behavior: Behavior normal.         Thought Content: Thought content normal.          Assessment:   Diagnosis Orders   1. Wellness examination  CBC with Auto Differential    Comprehensive Metabolic Panel    Lipid Panel    TSH with Reflex    Urinalysis with Reflex to Culture    PSA Screening   2. Screening for lipoid disorders  Lipid Panel   3. Screening for prostate cancer  PSA Screening   4. Leukocytosis, unspecified type  CBC with Auto Differential   5. Personal history of tobacco use  TX VISIT TO DISCUSS LUNG CA SCREEN W LDCT    CT Lung Screen (Annual)   6. Cigarette nicotine dependence, uncomplicated  varenicline (CHANTIX) 1 MG tablet    TX TOBACCO USE CESSATION INTERMEDIATE 3-10 MINUTES   7. Skin lesion  Amb External Referral To Dermatology   8. Erectile dysfunction, unspecified erectile dysfunction type  sildenafil (VIAGRA) 50 MG tablet         Plan:  Orders Placed This Encounter   Procedures    CT Lung Screen (Annual)     Age: Patient is 64 y.o. Smoking History: Social History    Tobacco Use      Smoking status: Current Every Day Smoker        Packs/day: 2.50        Years: 30.00        Pack years: 76        Types: Cigarettes        Start date: 7/19/1991      Smokeless tobacco: Never Used    Alcohol use: Not on file      Comment: occsionally    Drug use: Not on file   Pack years: 76    Date of last lung cancer screening: No previous lung cancer screening exam     Standing Status:   Future     Standing Expiration Date:   9/24/2023     Order Specific Question:   Is there documentation of shared decision making? Answer:   Yes     Order Specific Question:   Is this a low dose CT or a routine CT? Answer:   Low Dose CT [1]     Order Specific Question:   Is this the first (baseline) CT or an annual exam?     Answer:   Baseline [1]     Order Specific Question:   Does the patient show any signs or symptoms of lung cancer? Answer:   No     Order Specific Question:   Smoking Status? Answer:   Current Every Day Smoker [1]     Order Specific Question:   Smoking packs per day? Answer:   2.5     Order Specific Question:   Years smoking? Answer:   30    CBC with Auto Differential     Standing Status:   Future     Standing Expiration Date:   5/24/2022    Comprehensive Metabolic Panel     Standing Status:   Future     Standing Expiration Date:   5/24/2022    Lipid Panel     Standing Status:   Future     Standing Expiration Date:   5/24/2022     Order Specific Question:   Is Patient Fasting?/# of Hours     Answer: Yes    TSH with Reflex     Standing Status:   Future     Standing Expiration Date:   5/24/2022    Urinalysis with Reflex to Culture     Standing Status:   Future     Standing Expiration Date:   5/24/2022     Order Specific Question:   SPECIFY(EX-CATH,MIDSTREAM,CYSTO,ETC)? Answer:   midstream    PSA Screening     Standing Status:   Future     Standing Expiration Date:   5/24/2022    Amb External Referral To Dermatology     Referral Priority:   Routine     Referral Type:   Eval and Treat     Referral Reason:   Specialty Services Required     Requested Specialty:   Dermatology     Number of Visits Requested:   1    ID VISIT TO DISCUSS LUNG CA SCREEN W LDCT    ID TOBACCO USE CESSATION INTERMEDIATE 3-10 MINUTES         Patient Instructions     What is lung cancer screening? Lung cancer screening is a way in which doctors check the lungs for early signs of cancer in people who have no symptoms of lung cancer. A low-dose CT scan uses much less radiation than a normal CT scan and shows a more detailed image of the lungs than a standard X-ray. The goal of lung cancer screening is to find cancer early, before it has a chance to grow, spread, or cause problems. One large study found that smokers who were screened with low-dose CT scans were less likely to die of lung cancer than those who were screened with standard X-ray.     Below is a summary of the things you need to know regarding screening for lung cancer with low-dose computed tomography (LDCT). This is a screening program that involves routine annual screening with LDCT studies of the lung. The LDCTs are done using low-dose radiation that is not thought to increase your cancer risk. If you have other serious medical conditions (other cancers, congestive heart failure) that limit your life expectancy to less than 10 years, you should not undergo lung cancer screening with LDCT. The chance is 20%-60% that the LDCT result will show abnormalities. This would require additional testing which could include repeat imaging or even invasive procedures. Most (about 95%) of \"abnormal\" LDCT results are false in the sense that no lung cancer is ultimately found. Additionally, some (about 10%) of the cancers found would not affect your life expectancy, even if undetected and untreated. If you are still smoking, the single most important thing that you can do to reduce your risk of dying of lung cancer is to quit. For this screening to be covered by Medicare and most other insurers, strict criteria must be met. If you do not meet these criteria, but still wish to undergo LDCT testing, you will be required to sign a waiver indicating your willingness to pay for the scan. Nutrition Health Education:    Keep hydrated, walk 30 minutes minimum 3 times weekly as tolerated. Diet should consist of low fat, low sodium and high fiber. Nutritious foods such as fruits (if you're not diabetic), vegetables, lean meats, lean dairy, whole grains such as brown rice, quinoa, and dry beans. Willean Emerson with small amounts of heart healthy extra virgin olive oil. Be watchful of any extra salt/sugar/seasoning to your food. You should eat no more than 2 grams or 2,000 mg of salt daily. Salt will raise your BP.  Avoid regular/diet sodas, caffeine, energy drinks as these are full of artificial ingredients/sweeteners, sugar, salt and chemicals that spike insulin and are harmful to your health. Sugar intake increases metabolic disfunction, type 2 diabetes, insulin resistance, addictive food behavior and obesity. Avoid all processed foods, foods from boxes, cans, microwave meals as these contain high salt, sugar or fat content and not much nutrition. Get at least 8 hrs of sleep every night and turn off all electronics at least 1 hour before bedtime as these decreases melatonin production and increases wakefulness. If your cholesterol is high, no greasy, fried, fast or fatty foods. Decrease red meat intake. No butter, haywood, lard or creams, no milk as these things clog your arteries and leads to heart attacks and death. If you smoke, smoking increases risk of lung disease, cancers, high BP, heart attack, stroke and death. Take your daily medications as prescribed and inform your family doctor if you are having any side effects or issues taking medications. Tobacco/Nicotine Review:    I discussed the difference between the I NEED A NICOTINE CIGARETTE and the DIRTY HABIT CIGARETTE. I explained how to wean off of tobacco/nicotine and stop. Fasting blood work and physical exam within a month         Return in about 1 month (around 4/24/2022) for PHYSICAL EXAM , REVIEW LABS. Juju Rivas,    Low Dose CT (LDCT) Lung Screening criteria met:     Age 50-77(Medicare) or 50-80 (USPSTF)   Pack year smoking >20   Still smoking or less than 15 year since quit   No sign or symptoms of lung cancer   > 11 months since last LDCT     Risks and benefits of lung cancer screening with LDCT scans discussed:    Significance of positive screen - False-positive LDCT results often occur. 95% of all positive results do not lead to a diagnosis of cancer. Usually further imaging can resolve most false-positive results; however, some patients may require invasive procedures.     Over diagnosis risk - 10% to 12% of screen-detected lung cancer cases are over diagnosed--that is, the cancer would not have been detected in the patient's lifetime without the screening. Need for follow up screens annually to continue lung cancer screening effectiveness     Risks associated with radiation from annual LDCT- Radiation exposure is about the same as for a mammogram, which is about 1/3 of the annual background radiation exposure from everyday life. Starting screening at age 54 is not likely to increase cancer risk from radiation exposure. Patients with comorbidities resulting in life expectancy of < 10 years, or that would preclude treatment of an abnormality identified on CT, should not be screened due to lack of benefit.     To obtain maximal benefit from this screening, smoking cessation and long-term abstinence from smoking is critical

## 2022-04-04 ENCOUNTER — TELEPHONE (OUTPATIENT)
Dept: ONCOLOGY | Age: 57
End: 2022-04-04

## 2022-06-10 ENCOUNTER — HOSPITAL ENCOUNTER (OUTPATIENT)
Dept: CT IMAGING | Age: 57
Discharge: HOME | End: 2022-06-12
Payer: COMMERCIAL

## 2022-06-10 DIAGNOSIS — Z87.891 PERSONAL HISTORY OF TOBACCO USE: ICD-10-CM

## 2022-06-10 PROCEDURE — 71271 CT THORAX LUNG CANCER SCR C-: CPT

## 2022-07-22 ENCOUNTER — TELEPHONE (OUTPATIENT)
Dept: FAMILY MEDICINE CLINIC | Age: 57
End: 2022-07-22

## 2022-07-22 NOTE — TELEPHONE ENCOUNTER
----- Message from Jj Jaquez sent at 7/22/2022  8:49 AM EDT -----  Subject: Results Request    QUESTIONS  Results: CT scan; Ordered by:   Date Performed:   ---------------------------------------------------------------------------  --------------  Juanjose PASCUAL    [phone]; OK to leave message on voicemail  ---------------------------------------------------------------------------  --------------

## 2022-07-22 NOTE — TELEPHONE ENCOUNTER
----- Message from Betty Cohen sent at 7/22/2022  8:49 AM EDT -----  Subject: Results Request    QUESTIONS  Results: CT scan; Ordered by:   Date Performed:   ---------------------------------------------------------------------------  --------------  Henrique GIBBS    [phone]; OK to leave message on voicemail  ---------------------------------------------------------------------------  --------------

## 2022-07-25 ENCOUNTER — OFFICE VISIT (OUTPATIENT)
Dept: FAMILY MEDICINE CLINIC | Age: 57
End: 2022-07-25
Payer: COMMERCIAL

## 2022-07-25 VITALS
DIASTOLIC BLOOD PRESSURE: 80 MMHG | BODY MASS INDEX: 22.92 KG/M2 | SYSTOLIC BLOOD PRESSURE: 132 MMHG | WEIGHT: 169.2 LBS | RESPIRATION RATE: 16 BRPM | HEIGHT: 72 IN | HEART RATE: 63 BPM | TEMPERATURE: 97.3 F | OXYGEN SATURATION: 98 %

## 2022-07-25 DIAGNOSIS — Z11.59 ENCOUNTER FOR HEPATITIS C SCREENING TEST FOR LOW RISK PATIENT: ICD-10-CM

## 2022-07-25 DIAGNOSIS — Z12.5 PROSTATE CANCER SCREENING: ICD-10-CM

## 2022-07-25 DIAGNOSIS — R53.83 FATIGUE, UNSPECIFIED TYPE: ICD-10-CM

## 2022-07-25 DIAGNOSIS — Z00.00 WELLNESS EXAMINATION: ICD-10-CM

## 2022-07-25 DIAGNOSIS — R91.8 PULMONARY NODULES: ICD-10-CM

## 2022-07-25 DIAGNOSIS — J43.9 PULMONARY EMPHYSEMA, UNSPECIFIED EMPHYSEMA TYPE (HCC): Primary | ICD-10-CM

## 2022-07-25 DIAGNOSIS — Z13.220 ENCOUNTER FOR SCREENING FOR LIPOID DISORDERS: ICD-10-CM

## 2022-07-25 DIAGNOSIS — Z11.4 ENCOUNTER FOR SCREENING FOR HIV: ICD-10-CM

## 2022-07-25 PROCEDURE — 99214 OFFICE O/P EST MOD 30 MIN: CPT | Performed by: FAMILY MEDICINE

## 2022-07-25 ASSESSMENT — ENCOUNTER SYMPTOMS
WHEEZING: 0
CHOKING: 0
COUGH: 0
PHOTOPHOBIA: 0
CHEST TIGHTNESS: 0
ABDOMINAL PAIN: 0
SHORTNESS OF BREATH: 0

## 2022-07-25 ASSESSMENT — PATIENT HEALTH QUESTIONNAIRE - PHQ9
SUM OF ALL RESPONSES TO PHQ QUESTIONS 1-9: 0
1. LITTLE INTEREST OR PLEASURE IN DOING THINGS: 0
SUM OF ALL RESPONSES TO PHQ QUESTIONS 1-9: 0
2. FEELING DOWN, DEPRESSED OR HOPELESS: 0
SUM OF ALL RESPONSES TO PHQ9 QUESTIONS 1 & 2: 0

## 2022-07-25 NOTE — PATIENT INSTRUCTIONS
Nutrition Health Education:    Keep hydrated, walk 30 minutes minimum 3 times weekly as tolerated. Diet should consist of low fat, low sodium and high fiber. Nutritious foods such as fruits (if you're not diabetic), vegetables, lean meats, lean dairy, whole grains such as brown rice, quinoa, and dry beans. Stephanie Candi with small amounts of heart healthy extra virgin olive oil. Be watchful of any extra salt/sugar/seasoning to your food. You should eat no more than 2 grams or 2,000 mg of salt daily. Salt will raise your BP. Avoid regular/diet sodas, caffeine, energy drinks as these are full of artificial ingredients/sweeteners, sugar, salt and chemicals that spike insulin and are harmful to your health. Sugar intake increases metabolic disfunction, type 2 diabetes, insulin resistance, addictive food behavior and obesity. Avoid all processed foods, foods from boxes, cans, microwave meals as these contain high salt, sugar or fat content and not much nutrition. Get at least 8 hrs of sleep every night and turn off all electronics at least 1 hour before bedtime as these decreases melatonin production and increases wakefulness. If your cholesterol is high, no greasy, fried, fast or fatty foods. Decrease red meat intake. No butter, haywood, lard or creams, no milk as these things clog your arteries and leads to heart attacks and death. If you smoke, smoking increases risk of lung disease, cancers, high BP, heart attack, stroke and death. Take your daily medications as prescribed and inform your family doctor if you are having any side effects or issues taking medications.      Elevated Cholesterol:  No greasy, fried, fast, fatty foods  No trans fats  Decreased red meat intake to once every 2 months  No butter, haywood nor cream cheese, cheese  No egg yolk  NO milk  Decrease your cholesterol in diet    Pt has not tried to quit smoking   Reviewed CT scan with pt, emphysema, small lung benign nodules    Explained what a CT calcium heart

## 2022-07-25 NOTE — PROGRESS NOTES
Name: Sonja Riddle  DOS: 2022  MRN: [id number]      Subjective:  Sonja Riddle is a 62 y.o. male being seen for   Chief Complaint   Patient presents with    Follow-up     Follow up for Lung CT back from 2022. CT is in the imaging. Labs from 2022 have  and not have been completed as well. Vitals:    22 1610   BP: 132/80   Pulse: 63   Resp: 16   Temp: 97.3 °F (36.3 °C)   SpO2: 98%     No Known Allergies  No past medical history on file. Past Surgical History:   Procedure Laterality Date    COLONOSCOPY  2019    HAND SURGERY Right 2021    INCISION AND DRAINAGE TENDON SHEATH RIGHT FINGER performed by Carol Post MD at [address] [place] [address] History     Socioeconomic History    Marital status: Single   Tobacco Use    Smoking status: Every Day     Packs/day: 2.50     Years: 31.00     Pack years: 77.50     Types: Cigarettes     Start date: 1991    Smokeless tobacco: Never     Social Determinants of Health     Financial Resource Strain: Low Risk     Difficulty of Paying Living Expenses: Not hard at all   Food Insecurity: No Food Insecurity    Worried About Running Out of Food in the Last Year: Never true    Ran Out of Food in the Last Year: Never true       Current Outpatient Medications   Medication Sig Dispense Refill    varenicline (CHANTIX) 1 MG tablet Take 1 tablet by mouth 2 times daily 60 tablet 2    sildenafil (VIAGRA) 50 MG tablet Take 1 tablet by mouth daily as needed for Erectile Dysfunction (Patient not taking: Reported on 2022) 30 tablet 0    omeprazole (PRILOSEC) 20 MG delayed release capsule Take 20 mg by mouth daily (Patient not taking: Reported on 2022)       No current facility-administered medications for this visit. Objective:  Pt is here for follow up. He has never done the blood work I had ordered in  and has not followed up. Review of Systems   Constitutional:  Positive for fatigue (sometimes). Negative for unexpected weight change. Eyes:  Negative for photophobia and visual disturbance. Respiratory:  Negative for cough, choking, chest tightness, shortness of breath and wheezing. Cardiovascular:  Negative for chest pain, palpitations and leg swelling. Gastrointestinal:  Negative for abdominal pain. Genitourinary:  Negative for difficulty urinating and hematuria. Musculoskeletal:  Negative for arthralgias and myalgias. Skin:  Negative for rash and wound. Neurological:  Negative for dizziness, seizures, weakness, light-headedness, numbness and headaches. Hematological:  Negative for adenopathy. Does not bruise/bleed easily. Psychiatric/Behavioral:  Negative for agitation, confusion, decreased concentration and suicidal ideas. Physical Exam  Constitutional:       General: He is not in acute distress. Appearance: Normal appearance. He is not ill-appearing, toxic-appearing or diaphoretic. HENT:      Head: Normocephalic and atraumatic. Right Ear: Tympanic membrane, ear canal and external ear normal. There is no impacted cerumen. Left Ear: Tympanic membrane, ear canal and external ear normal. There is no impacted cerumen. Nose: Nose normal. No congestion or rhinorrhea. Mouth/Throat:      Mouth: Mucous membranes are moist.      Pharynx: Oropharynx is clear. No oropharyngeal exudate or posterior oropharyngeal erythema. Eyes:      Extraocular Movements: Extraocular movements intact. Conjunctiva/sclera: Conjunctivae normal.      Pupils: Pupils are equal, round, and reactive to light. Cardiovascular:      Rate and Rhythm: Normal rate and regular rhythm. Pulses: Normal pulses. Heart sounds: Normal heart sounds. No murmur heard. Pulmonary:      Effort: Pulmonary effort is normal.      Breath sounds: Normal breath sounds. No wheezing, rhonchi or rales. Abdominal:      General: Abdomen is flat. Bowel sounds are normal. There is no distension. Palpations: Abdomen is soft. There is no mass. Tenderness: There is no abdominal tenderness. There is no right CVA tenderness, left CVA tenderness or guarding. Musculoskeletal:         General: Normal range of motion. Cervical back: Normal range of motion and neck supple. Right lower leg: No edema. Left lower leg: No edema. Lymphadenopathy:      Cervical: No cervical adenopathy. Skin:     General: Skin is warm. Capillary Refill: Capillary refill takes less than 2 seconds. Neurological:      General: No focal deficit present. Mental Status: He is alert and oriented to person, place, and time. Motor: No weakness. Coordination: Coordination normal.      Gait: Gait normal.   Psychiatric:         Mood and Affect: Mood normal.         Behavior: Behavior normal.         Thought Content: Thought content normal.        Assessment:   Diagnosis Orders   1. Wellness examination  CBC with Auto Differential    Comprehensive Metabolic Panel    Lipid Panel    TSH with Reflex    PSA Screening    HIV Screen    Hepatitis C Antibody      2. Encounter for hepatitis C screening test for low risk patient  Hepatitis C Antibody      3. Encounter for screening for HIV  HIV Screen      4. Prostate cancer screening  PSA Screening      5.  Encounter for screening for lipoid disorders  Lipid Panel            Plan:  Orders Placed This Encounter   Procedures    CBC with Auto Differential     Standing Status:   Future     Standing Expiration Date:   8/25/2022    Comprehensive Metabolic Panel     Standing Status:   Future     Standing Expiration Date:   8/25/2022    Lipid Panel     Standing Status:   Future     Standing Expiration Date:   8/25/2022     Order Specific Question:   Is Patient Fasting?/# of Hours     Answer:   Yes    TSH with Reflex     Standing Status:   Future     Standing Expiration Date:   8/25/2022    PSA Screening     Standing Status:   Future     Standing Expiration Date:   8/25/2022 HIV Screen     Standing Status:   Future     Standing Expiration Date:   8/25/2022    Hepatitis C Antibody     Standing Status:   Future     Standing Expiration Date:   8/25/2022           Patient Instructions   Nutrition Health Education:    Keep hydrated, walk 30 minutes minimum 3 times weekly as tolerated. Diet should consist of low fat, low sodium and high fiber. Nutritious foods such as fruits (if you're not diabetic), vegetables, lean meats, lean dairy, whole grains such as brown rice, quinoa, and dry beans. Catherine Boyers with small amounts of heart healthy extra virgin olive oil. Be watchful of any extra salt/sugar/seasoning to your food. You should eat no more than 2 grams or 2,000 mg of salt daily. Salt will raise your BP. Avoid regular/diet sodas, caffeine, energy drinks as these are full of artificial ingredients/sweeteners, sugar, salt and chemicals that spike insulin and are harmful to your health. Sugar intake increases metabolic disfunction, type 2 diabetes, insulin resistance, addictive food behavior and obesity. Avoid all processed foods, foods from boxes, cans, microwave meals as these contain high salt, sugar or fat content and not much nutrition. Get at least 8 hrs of sleep every night and turn off all electronics at least 1 hour before bedtime as these decreases melatonin production and increases wakefulness. If your cholesterol is high, no greasy, fried, fast or fatty foods. Decrease red meat intake. No butter, haywood, lard or creams, no milk as these things clog your arteries and leads to heart attacks and death. If you smoke, smoking increases risk of lung disease, cancers, high BP, heart attack, stroke and death. Take your daily medications as prescribed and inform your family doctor if you are having any side effects or issues taking medications.      Elevated Cholesterol:  No greasy, fried, fast, fatty foods  No trans fats  Decreased red meat intake to once every 2 months  No butter, haywood nor cream cheese, cheese  No egg yolk  NO milk  Decrease your cholesterol in diet    Pt has not tried to quit smoking     Explained what a CT calcium heart score is and he is willing to do it at [facility] radiology dept. Fasting blood work Monday as that is when he says he can do it re ordered this week follow up 2 weeks       Return in about 2 weeks (around 8/8/2022) for REVIEW LABS.      Latonya Sneed, DO

## 2022-07-26 ENCOUNTER — TELEPHONE (OUTPATIENT)
Dept: FAMILY MEDICINE CLINIC | Age: 57
End: 2022-07-26

## 2022-07-26 NOTE — TELEPHONE ENCOUNTER
Patient's wife called wanting to know if he could do a CTA coronary artery with no show. They were looking for more in depth of percentage os stenosis for the cardiologist. As this one will have it as well. If you agree to order this test they will be scheduling at [facility].

## 2022-07-27 NOTE — TELEPHONE ENCOUNTER
I had a hard time understanding the wife as well. She stated a different imaging test would give better coverage for the cardiologist and give the calcium heart score you ordered.  They were aware he has calcium deposits but were hoping to get a better reading with the CTA of the coronary Artery test.

## 2022-08-01 ENCOUNTER — HOSPITAL ENCOUNTER (OUTPATIENT)
Age: 57
Setting detail: SPECIMEN
Discharge: HOME | End: 2022-08-01

## 2022-08-01 DIAGNOSIS — Z00.00 WELLNESS EXAMINATION: ICD-10-CM

## 2022-08-01 DIAGNOSIS — Z11.59 ENCOUNTER FOR HEPATITIS C SCREENING TEST FOR LOW RISK PATIENT: ICD-10-CM

## 2022-08-01 DIAGNOSIS — Z13.220 ENCOUNTER FOR SCREENING FOR LIPOID DISORDERS: ICD-10-CM

## 2022-08-01 DIAGNOSIS — Z11.4 ENCOUNTER FOR SCREENING FOR HIV: ICD-10-CM

## 2022-08-01 DIAGNOSIS — Z12.5 PROSTATE CANCER SCREENING: ICD-10-CM

## 2022-08-01 LAB
ABSOLUTE EOS #: 0.14 K/UL (ref 0–0.44)
ABSOLUTE IMMATURE GRANULOCYTE: <0.03 K/UL (ref 0–0.3)
ABSOLUTE LYMPH #: 2.13 K/UL (ref 1.1–3.7)
ABSOLUTE MONO #: 0.66 K/UL (ref 0.1–1.2)
ALBUMIN SERPL-MCNC: 4.6 G/DL (ref 3.5–5.2)
ALBUMIN/GLOBULIN RATIO: 1.9 (ref 1–2.5)
ALP BLD-CCNC: 64 U/L (ref 40–129)
ALT SERPL-CCNC: 15 U/L (ref 5–41)
ANION GAP SERPL CALCULATED.3IONS-SCNC: 13 MMOL/L (ref 9–17)
AST SERPL-CCNC: 19 U/L
BASOPHILS # BLD: 1 % (ref 0–2)
BASOPHILS ABSOLUTE: 0.08 K/UL (ref 0–0.2)
BILIRUB SERPL-MCNC: 0.3 MG/DL (ref 0.3–1.2)
BUN BLDV-MCNC: 13 MG/DL (ref 6–20)
CALCIUM SERPL-MCNC: 9 MG/DL (ref 8.6–10.4)
CHLORIDE BLD-SCNC: 99 MMOL/L (ref 98–107)
CHOLESTEROL/HDL RATIO: 4.8
CHOLESTEROL: 207 MG/DL
CO2: 25 MMOL/L (ref 20–31)
CREAT SERPL-MCNC: 0.77 MG/DL (ref 0.7–1.2)
EOSINOPHILS RELATIVE PERCENT: 2 % (ref 1–4)
GFR AFRICAN AMERICAN: >60 ML/MIN
GFR NON-AFRICAN AMERICAN: >60 ML/MIN
GFR SERPL CREATININE-BSD FRML MDRD: ABNORMAL ML/MIN/{1.73_M2}
GLUCOSE BLD-MCNC: 103 MG/DL (ref 70–99)
HCT VFR BLD CALC: 46.3 % (ref 40.7–50.3)
HDLC SERPL-MCNC: 43 MG/DL
HEMOGLOBIN: 15.3 G/DL (ref 13–17)
HEPATITIS C ANTIBODY: NONREACTIVE
HIV AG/AB: NONREACTIVE
IMMATURE GRANULOCYTES: 0 %
LDL CHOLESTEROL: 115 MG/DL (ref 0–130)
LYMPHOCYTES # BLD: 29 % (ref 24–43)
MCH RBC QN AUTO: 32.1 PG (ref 25.2–33.5)
MCHC RBC AUTO-ENTMCNC: 33 G/DL (ref 28.4–34.8)
MCV RBC AUTO: 97.1 FL (ref 82.6–102.9)
MONOCYTES # BLD: 9 % (ref 3–12)
NRBC AUTOMATED: 0 PER 100 WBC
PDW BLD-RTO: 11.9 % (ref 11.8–14.4)
PLATELET # BLD: 222 K/UL (ref 138–453)
PMV BLD AUTO: 11.2 FL (ref 8.1–13.5)
POTASSIUM SERPL-SCNC: 4.4 MMOL/L (ref 3.7–5.3)
PROSTATE SPECIFIC ANTIGEN: 0.96 NG/ML
RBC # BLD: 4.77 M/UL (ref 4.21–5.77)
SEG NEUTROPHILS: 59 % (ref 36–65)
SEGMENTED NEUTROPHILS ABSOLUTE COUNT: 4.45 K/UL (ref 1.5–8.1)
SODIUM BLD-SCNC: 137 MMOL/L (ref 135–144)
TOTAL PROTEIN: 7 G/DL (ref 6.4–8.3)
TRIGL SERPL-MCNC: 244 MG/DL
TSH SERPL DL<=0.05 MIU/L-ACNC: 0.95 UIU/ML (ref 0.3–5)
WBC # BLD: 7.5 K/UL (ref 3.5–11.3)

## 2022-08-08 ENCOUNTER — OFFICE VISIT (OUTPATIENT)
Dept: FAMILY MEDICINE CLINIC | Age: 57
End: 2022-08-08
Payer: COMMERCIAL

## 2022-08-08 VITALS
SYSTOLIC BLOOD PRESSURE: 130 MMHG | HEIGHT: 72 IN | HEART RATE: 81 BPM | OXYGEN SATURATION: 98 % | RESPIRATION RATE: 16 BRPM | TEMPERATURE: 97.8 F | BODY MASS INDEX: 22.92 KG/M2 | DIASTOLIC BLOOD PRESSURE: 70 MMHG | WEIGHT: 169.2 LBS

## 2022-08-08 DIAGNOSIS — N52.9 ERECTILE DYSFUNCTION, UNSPECIFIED ERECTILE DYSFUNCTION TYPE: ICD-10-CM

## 2022-08-08 DIAGNOSIS — E78.2 MIXED HYPERLIPIDEMIA: Primary | ICD-10-CM

## 2022-08-08 DIAGNOSIS — R73.9 HYPERGLYCEMIA: ICD-10-CM

## 2022-08-08 DIAGNOSIS — L98.9 SKIN LESIONS: ICD-10-CM

## 2022-08-08 DIAGNOSIS — F17.210 CIGARETTE NICOTINE DEPENDENCE, UNCOMPLICATED: ICD-10-CM

## 2022-08-08 LAB — HBA1C MFR BLD: 5.8 %

## 2022-08-08 PROCEDURE — 99214 OFFICE O/P EST MOD 30 MIN: CPT | Performed by: FAMILY MEDICINE

## 2022-08-08 PROCEDURE — 83036 HEMOGLOBIN GLYCOSYLATED A1C: CPT | Performed by: FAMILY MEDICINE

## 2022-08-08 RX ORDER — SILDENAFIL 50 MG/1
50 TABLET, FILM COATED ORAL DAILY PRN
Qty: 30 TABLET | Refills: 1 | Status: SHIPPED | OUTPATIENT
Start: 2022-08-08 | End: 2022-09-23

## 2022-08-08 RX ORDER — VARENICLINE TARTRATE 1 MG/1
1 TABLET, FILM COATED ORAL 2 TIMES DAILY
Qty: 60 TABLET | Refills: 1 | Status: SHIPPED | OUTPATIENT
Start: 2022-08-08 | End: 2022-10-07

## 2022-08-08 SDOH — ECONOMIC STABILITY: FOOD INSECURITY: WITHIN THE PAST 12 MONTHS, YOU WORRIED THAT YOUR FOOD WOULD RUN OUT BEFORE YOU GOT MONEY TO BUY MORE.: NEVER TRUE

## 2022-08-08 SDOH — ECONOMIC STABILITY: FOOD INSECURITY: WITHIN THE PAST 12 MONTHS, THE FOOD YOU BOUGHT JUST DIDN'T LAST AND YOU DIDN'T HAVE MONEY TO GET MORE.: NEVER TRUE

## 2022-08-08 ASSESSMENT — ENCOUNTER SYMPTOMS
BLOOD IN STOOL: 0
ABDOMINAL PAIN: 0
CHOKING: 0
CHEST TIGHTNESS: 0
COUGH: 0
CONSTIPATION: 0
DIARRHEA: 0
BACK PAIN: 0
SHORTNESS OF BREATH: 0
VOMITING: 0
WHEEZING: 0
PHOTOPHOBIA: 0
NAUSEA: 0

## 2022-08-08 ASSESSMENT — PATIENT HEALTH QUESTIONNAIRE - PHQ9
SUM OF ALL RESPONSES TO PHQ9 QUESTIONS 1 & 2: 0
2. FEELING DOWN, DEPRESSED OR HOPELESS: 0
SUM OF ALL RESPONSES TO PHQ QUESTIONS 1-9: 0
SUM OF ALL RESPONSES TO PHQ QUESTIONS 1-9: 0
1. LITTLE INTEREST OR PLEASURE IN DOING THINGS: 0
SUM OF ALL RESPONSES TO PHQ QUESTIONS 1-9: 0
SUM OF ALL RESPONSES TO PHQ QUESTIONS 1-9: 0

## 2022-08-08 ASSESSMENT — SOCIAL DETERMINANTS OF HEALTH (SDOH): HOW HARD IS IT FOR YOU TO PAY FOR THE VERY BASICS LIKE FOOD, HOUSING, MEDICAL CARE, AND HEATING?: NOT HARD AT ALL

## 2022-08-08 NOTE — PATIENT INSTRUCTIONS
Nutrition Health Education:    Keep hydrated, walk 30 minutes minimum 3 times weekly as tolerated. Diet should consist of low fat, low sodium and high fiber. Nutritious foods such as fruits (if you're not diabetic), vegetables, lean meats, lean dairy, whole grains such as brown rice, quinoa, and dry beans. Michial Pritesh with small amounts of heart healthy extra virgin olive oil. Be watchful of any extra salt/sugar/seasoning to your food. You should eat no more than 2 grams or 2,000 mg of salt daily. Salt will raise your BP. Avoid regular/diet sodas, caffeine, energy drinks as these are full of artificial ingredients/sweeteners, sugar, salt and chemicals that spike insulin and are harmful to your health. Sugar intake increases metabolic disfunction, type 2 diabetes, insulin resistance, addictive food behavior and obesity. Avoid all processed foods, foods from boxes, cans, microwave meals as these contain high salt, sugar or fat content and not much nutrition. Get at least 8 hrs of sleep every night and turn off all electronics at least 1 hour before bedtime as these decreases melatonin production and increases wakefulness. If your cholesterol is high, no greasy, fried, fast or fatty foods. Decrease red meat intake. No butter, haywood, lard or creams, no milk as these things clog your arteries and leads to heart attacks and death. If you smoke, smoking increases risk of lung disease, cancers, high BP, heart attack, stroke and death. Take your daily medications as prescribed and inform your family doctor if you are having any side effects or issues taking medications.      Elevated Cholesterol:  No greasy, fried, fast, fatty foods  No trans fats  Decreased red meat intake to once every 2 months  No butter, haywood nor cream cheese, cheese  No egg yolk  NO milk  Decrease your cholesterol in diet    ELEVATED GLUCOSE  NO sugar, decrease fruit intake, No juice, NO veggies with color other than green, NO sauteed onions or anything that caramelizes, you may eat raw onions. NO alcohol, try not to eat diabetic candies as they may cause diarrhea. Minimize your carbohydrate intake.         HgA1C 5.8 reviewed with pt     Reviewed labs with pt cbc,cmp,lipids,tsh,psa, hiv hep c    Go to the health department for hep B series    Wants to try to decrease cholesterol with diet rather than medication right now    Ordered CT calcium heart score    Refer to dermatology needs a body check    Fasting blood work and follow up in 3 months

## 2022-08-23 ENCOUNTER — OFFICE VISIT (OUTPATIENT)
Dept: CARDIOLOGY | Age: 57
End: 2022-08-23
Payer: COMMERCIAL

## 2022-08-23 VITALS
WEIGHT: 171.5 LBS | BODY MASS INDEX: 23.23 KG/M2 | HEART RATE: 64 BPM | HEIGHT: 72 IN | OXYGEN SATURATION: 97 % | DIASTOLIC BLOOD PRESSURE: 76 MMHG | RESPIRATION RATE: 18 BRPM | SYSTOLIC BLOOD PRESSURE: 126 MMHG

## 2022-08-23 DIAGNOSIS — I25.84 CORONARY ARTERY CALCIFICATION: Primary | ICD-10-CM

## 2022-08-23 DIAGNOSIS — E78.49 OTHER HYPERLIPIDEMIA: ICD-10-CM

## 2022-08-23 DIAGNOSIS — I25.10 CORONARY ARTERY CALCIFICATION: Primary | ICD-10-CM

## 2022-08-23 PROCEDURE — 93000 ELECTROCARDIOGRAM COMPLETE: CPT | Performed by: INTERNAL MEDICINE

## 2022-08-23 PROCEDURE — 99204 OFFICE O/P NEW MOD 45 MIN: CPT | Performed by: INTERNAL MEDICINE

## 2022-08-23 RX ORDER — ASPIRIN 81 MG/1
81 TABLET ORAL DAILY
Qty: 90 TABLET | Refills: 1 | Status: SHIPPED | OUTPATIENT
Start: 2022-08-23

## 2022-08-23 NOTE — PROGRESS NOTES
Cardiology Consultation/Follow Up. [facility]    CC: Patient is here to establish cardiac care. HPI  Alec Jack is a 59-year-old male with no prior cardiac history is here for initial evaluation. He was found to have coronary artery calcification on CT lung screening. As far as symptoms are concerned he denies any recent chest pain, angina, pressure or jaw pain. He has been smoker for a long time and reports occasional dyspnea on moderate to severe exertion. No dyspnea on routine activities. No orthopnea, lower extremity edema. Does report fatigue over last few weeks. Past Medical:  No past medical history on file. Past Surgical:  Past Surgical History:   Procedure Laterality Date    COLONOSCOPY  11/01/2019    HAND SURGERY Right 2/24/2021    INCISION AND DRAINAGE TENDON SHEATH RIGHT FINGER performed by Jackie Dhillon MD at [facility] OR       Family History:  No family history on file. Social History:  Social History     Tobacco Use    Smoking status: Every Day     Packs/day: 2.50     Years: 31.00     Pack years: 77.50     Types: Cigarettes     Start date: 7/19/1991    Smokeless tobacco: Never        REVIEW OF SYSTEMS:    Constitutional: there has been no unanticipated weight loss. There's been No change in energy level, No change in activity level. Eyes: No visual changes or diplopia. No scleral icterus. ENT: No Headaches, hearing loss or vertigo. No mouth sores or sore throat. Cardiovascular: As described in HPI. Respiratory: AS HPI  Gastrointestinal: No abdominal pain, appetite loss, blood in stools. No change in bowel or bladder habits. Genitourinary: No dysuria, trouble voiding, or hematuria. Musculoskeletal:  No gait disturbance, No weakness or joint complaints. Integumentary: No rash or pruritis. Neurological: No headache, diplopia, change in muscle strength, numbness or tingling  Psychiatric: No new anxiety or depression.   Endocrine: No temperature intolerance. No excessive thirst, fluid intake, or urination. No tremor. Hematologic/Lymphatic: No abnormal bruising or bleeding, blood clots or swollen lymph nodes. Allergic/Immunologic: No nasal congestion or hives. Medications:  Current Outpatient Medications   Medication Sig Dispense Refill    aspirin EC 81 MG EC tablet Take 1 tablet by mouth daily 90 tablet 1    sildenafil (VIAGRA) 50 MG tablet Take 1 tablet by mouth daily as needed for Erectile Dysfunction 30 tablet 1    varenicline (CHANTIX) 1 MG tablet Take 1 tablet by mouth in the morning and 1 tablet before bedtime. 60 tablet 1    omeprazole (PRILOSEC) 20 MG delayed release capsule Take 20 mg by mouth in the morning. No current facility-administered medications for this visit. Physical Exam:   Vitals: /76   Pulse 64   Resp 18   Ht 6' (1.829 m)   Wt 171 lb 8 oz (77.8 kg)   SpO2 97%   BMI 23.26 kg/m²   General appearance: alert, oriented and cooperative with exam  HEENT: Head: Normocephalic, no lesions, without obvious abnormality. Neck: no carotid bruit, no JVD  Lungs: clear to auscultation bilaterally without any wheezing or rales   Heart: regular rate and rhythm, S1, S2 normal, no murmur, click, rub or gallop  Abdomen: soft, non-tender; bowel sounds normal; no masses,  no organomegaly  Extremities: extremities normal, atraumatic, no cyanosis or edema  Neurologic: Mental status: Alert, oriented, thought content appropriate    Labs:  CBC: No results for input(s): WBC, HGB, HCT, PLT in the last 72 hours. BMP: No results for input(s): NA, K, CO2, BUN, CREATININE, LABGLOM, GLUCOSE in the last 72 hours. PT/INR: No results for input(s): PROTIME, INR in the last 72 hours.   FASTING LIPID PANEL:  Lab Results   Component Value Date/Time    HDL 43 08/01/2022 10:04 AM    TRIG 244 08/01/2022 10:04 AM       EKG 8/23/2022: NSR, NL ECG      Past Medical and Surgical History, Problem List, Allergies, Medications, Labs, Imaging, all reviewed extensively in EMR and with the patient. Assessment:   Coronary artery calcification  Dyspnea on moderate exertion  Chronic active smoker  Dyslipidemia      Plan:   Recommend treadmill Cardiolite stress test for further restratification  Start aspirin 81 mg daily  Monitor lipid panel. If LDL remains persistently elevated after dietary modifications, will start low-dose statin  Counseled in detail regarding importance of smoking cessation, various strategies discussed, patient is currently on Chantix and has been trying to cut down    The patient was counseled regarding heart healthy diet, weight loss and exercise as tolerated. Patient's medications and side effects were discussed. All questions and concerns were addressed to patient's satisfaction. The patient is to follow up in 6 months or sooner if necessary. Thank you for allowing me to participate in the care of this patient, please do not hesitate to call if you have any questions. Lakeisha Mcfarland MD, [address] [facility]  [facility]  [phone]

## 2022-09-08 ENCOUNTER — HOSPITAL ENCOUNTER (OUTPATIENT)
Dept: NUCLEAR MEDICINE | Age: 57
Discharge: HOME | End: 2022-09-10
Payer: COMMERCIAL

## 2022-09-08 ENCOUNTER — HOSPITAL ENCOUNTER (OUTPATIENT)
Dept: NON INVASIVE DIAGNOSTICS | Age: 57
Discharge: HOME | End: 2022-09-08
Payer: COMMERCIAL

## 2022-09-08 ENCOUNTER — TELEPHONE (OUTPATIENT)
Dept: CARDIOLOGY | Age: 57
End: 2022-09-08

## 2022-09-08 DIAGNOSIS — I25.10 CORONARY ARTERY CALCIFICATION: ICD-10-CM

## 2022-09-08 DIAGNOSIS — I25.84 CORONARY ARTERY CALCIFICATION: ICD-10-CM

## 2022-09-08 DIAGNOSIS — E78.49 OTHER HYPERLIPIDEMIA: ICD-10-CM

## 2022-09-08 DIAGNOSIS — R94.39 ABNORMAL STRESS TEST: Primary | ICD-10-CM

## 2022-09-08 PROCEDURE — 78452 HT MUSCLE IMAGE SPECT MULT: CPT

## 2022-09-08 PROCEDURE — 93017 CV STRESS TEST TRACING ONLY: CPT

## 2022-09-08 PROCEDURE — 3430000000 HC RX DIAGNOSTIC RADIOPHARMACEUTICAL: Performed by: INTERNAL MEDICINE

## 2022-09-08 PROCEDURE — A9500 TC99M SESTAMIBI: HCPCS | Performed by: INTERNAL MEDICINE

## 2022-09-08 RX ADMIN — TETRAKIS(2-METHOXYISOBUTYLISOCYANIDE)COPPER(I) TETRAFLUOROBORATE 10 MILLICURIE: 1 INJECTION, POWDER, LYOPHILIZED, FOR SOLUTION INTRAVENOUS at 10:49

## 2022-09-08 RX ADMIN — TETRAKIS(2-METHOXYISOBUTYLISOCYANIDE)COPPER(I) TETRAFLUOROBORATE 30 MILLICURIE: 1 INJECTION, POWDER, LYOPHILIZED, FOR SOLUTION INTRAVENOUS at 10:49

## 2022-09-08 NOTE — PROCEDURES
Yes.    Moderate risk study. IMPRESSION:  1. Large area of fixed/matching perfusion defect in inferior wall  suggesting infarct. 2. No definitive exercise perfusion defect to suggest ischemia. 3. Normal left ventricular ejection fraction. [facility]    D: 09/08/2022 14:32:48       T: 09/08/2022 14:34:31     [email or web address]  Job#: [id number]     Doc#: Unknown    CC:  Jaci Silva, DO

## 2022-09-09 NOTE — TELEPHONE ENCOUNTER
Per Dr Dixie Levy he spoke to patient's wife regarding results and recommendations. Left message on wife's name identifiable voicemail of orders faxed to [facility] and that [facility] will call pt to schedule cath.

## 2022-09-23 ENCOUNTER — HOSPITAL ENCOUNTER (OUTPATIENT)
Dept: CARDIAC CATH/INVASIVE PROCEDURES | Age: 57
Discharge: HOME | End: 2022-09-23
Payer: COMMERCIAL

## 2022-09-23 VITALS
DIASTOLIC BLOOD PRESSURE: 67 MMHG | BODY MASS INDEX: 24.22 KG/M2 | SYSTOLIC BLOOD PRESSURE: 117 MMHG | RESPIRATION RATE: 13 BRPM | TEMPERATURE: 98.5 F | HEART RATE: 57 BPM | HEIGHT: 71 IN | WEIGHT: 173 LBS | OXYGEN SATURATION: 96 %

## 2022-09-23 LAB
GFR NON-AFRICAN AMERICAN: >60 ML/MIN
GFR SERPL CREATININE-BSD FRML MDRD: >60 ML/MIN
GFR SERPL CREATININE-BSD FRML MDRD: NORMAL ML/MIN/{1.73_M2}
GLUCOSE BLD-MCNC: 101 MG/DL (ref 74–100)
PLATELET # BLD: 201 K/UL (ref 138–453)
POC BUN: 11 MG/DL (ref 8–26)
POC CHLORIDE: 104 MMOL/L (ref 98–107)
POC CREATININE: 0.69 MG/DL (ref 0.51–1.19)
POC HEMATOCRIT: 44 % (ref 41–53)
POC HEMOGLOBIN: 14.9 G/DL (ref 13.5–17.5)
POC IONIZED CALCIUM: 1.2 MMOL/L (ref 1.15–1.33)
POC POTASSIUM: 4.3 MMOL/L (ref 3.5–4.5)
POC SODIUM: 142 MMOL/L (ref 138–146)

## 2022-09-23 PROCEDURE — C1887 CATHETER, GUIDING: HCPCS

## 2022-09-23 PROCEDURE — 99152 MOD SED SAME PHYS/QHP 5/>YRS: CPT

## 2022-09-23 PROCEDURE — 85049 AUTOMATED PLATELET COUNT: CPT

## 2022-09-23 PROCEDURE — 82330 ASSAY OF CALCIUM: CPT

## 2022-09-23 PROCEDURE — 93458 L HRT ARTERY/VENTRICLE ANGIO: CPT

## 2022-09-23 PROCEDURE — 84295 ASSAY OF SERUM SODIUM: CPT

## 2022-09-23 PROCEDURE — 2709999900 HC NON-CHARGEABLE SUPPLY

## 2022-09-23 PROCEDURE — 84132 ASSAY OF SERUM POTASSIUM: CPT

## 2022-09-23 PROCEDURE — 7100000001 HC PACU RECOVERY - ADDTL 15 MIN

## 2022-09-23 PROCEDURE — 82435 ASSAY OF BLOOD CHLORIDE: CPT

## 2022-09-23 PROCEDURE — 84520 ASSAY OF UREA NITROGEN: CPT

## 2022-09-23 PROCEDURE — 6360000002 HC RX W HCPCS

## 2022-09-23 PROCEDURE — 7100000000 HC PACU RECOVERY - FIRST 15 MIN

## 2022-09-23 PROCEDURE — 6360000004 HC RX CONTRAST MEDICATION

## 2022-09-23 PROCEDURE — 82565 ASSAY OF CREATININE: CPT

## 2022-09-23 PROCEDURE — 82947 ASSAY GLUCOSE BLOOD QUANT: CPT

## 2022-09-23 PROCEDURE — C1894 INTRO/SHEATH, NON-LASER: HCPCS

## 2022-09-23 PROCEDURE — 85014 HEMATOCRIT: CPT

## 2022-09-23 RX ORDER — ACETAMINOPHEN 325 MG/1
650 TABLET ORAL EVERY 4 HOURS PRN
Status: DISCONTINUED | OUTPATIENT
Start: 2022-09-23 | End: 2022-09-24 | Stop reason: HOSPADM

## 2022-09-23 RX ORDER — SODIUM CHLORIDE 9 MG/ML
INJECTION, SOLUTION INTRAVENOUS PRN
Status: DISCONTINUED | OUTPATIENT
Start: 2022-09-23 | End: 2022-09-24 | Stop reason: HOSPADM

## 2022-09-23 RX ORDER — SODIUM CHLORIDE 0.9 % (FLUSH) 0.9 %
5-40 SYRINGE (ML) INJECTION PRN
Status: DISCONTINUED | OUTPATIENT
Start: 2022-09-23 | End: 2022-09-24 | Stop reason: HOSPADM

## 2022-09-23 RX ORDER — SODIUM CHLORIDE 9 MG/ML
INJECTION, SOLUTION INTRAVENOUS CONTINUOUS
Status: DISCONTINUED | OUTPATIENT
Start: 2022-09-23 | End: 2022-09-24 | Stop reason: HOSPADM

## 2022-09-23 RX ORDER — ATORVASTATIN CALCIUM 40 MG/1
40 TABLET, FILM COATED ORAL DAILY
Qty: 30 TABLET | Refills: 3 | Status: SHIPPED | OUTPATIENT
Start: 2022-09-23

## 2022-09-23 RX ORDER — SODIUM CHLORIDE 0.9 % (FLUSH) 0.9 %
5-40 SYRINGE (ML) INJECTION EVERY 12 HOURS SCHEDULED
Status: DISCONTINUED | OUTPATIENT
Start: 2022-09-23 | End: 2022-09-24 | Stop reason: HOSPADM

## 2022-09-23 RX ADMIN — SODIUM CHLORIDE: 9 INJECTION, SOLUTION INTRAVENOUS at 11:17

## 2022-09-23 NOTE — PROGRESS NOTES
Dr. Ernst Hjai informed of Pt having 4 consecutive run of PVC s. No orders received. IV  and vasc band removed. Discharged with wife.

## 2022-09-23 NOTE — DISCHARGE INSTRUCTIONS
DISCHARGE INSTRUCTIONS / ARM CARE POST CATHERIZATION        ENCOURAGE FLUIDS    NO STRENUOUS LIFTING WITH AFFECTED ARM FOR 3 DAYS ANYTHING HEAVIER THAN 8 TO 10 POUNDS    REMOVE BAND-AID/PRESSURE DRESSING THE FOLLOWING DAY AND DO NOT APPLY ANY FURTHER BAND-AIDS    KEEP INCISION CLEAN DRY AND OPEN TO THE AIR / NO HAND LOTION NEAR PUNCTURE SITE    WATCH FOR SIGNS OF INFECTION /  REDNESS / SWELLING / DRAINAGE / Chariton Silvino / TEMPERATURE GREATER THAN 101    IF BLEEDING OCCURS HOLD MANUAL PRESSURE DIRECTLY OVER SITE  (YOU WILL FEEL PULSATION OF ARTERY) AND IF BLEEDING DOES NOT STOP AFTER 2 MINUTES CALL 911    OK TO SHOWER THE NEXT DAY, NO TUB BATHING OR HOT TUBS/SWIMMING FOR 7 DAYS    IF AREA BECOMES HARD AND SWOLLEN AND IF YOU ARE AT ALL CONCERNED SEEK HELP IMMEDIATELY    SEEK HELP IMMEDIATELY IF AFFECTED ARM BECOMES COLD / Lillette Bouquet / SEVERE PAIN / NAILBEDS TURN BLUE     IF ON METFORMIN / GLUCOPHAGE DO NOT RESTART MEDICATION FOR 48 HOURS    PLEASE PRACTICE GOOD HAND WASHING AND INCLUDE PUNCTURE SITE ESPECIALLY AFTER USING THE RESTROOM    AVOID USING ALCOHOL BASED HAND SANITIZERS FOR ONE WEEK      CALL 911 if you have symptoms including:   Drooping facial muscles   Changes in vision or speech   Difficulty walking or using your limbs   Change in sensation to affected leg, including numbness, feeling cold, or change in color   Extreme sweating, nausea or vomiting   Dizziness or lightheadedness   Chest pain   Rapid, irregular heartbeat   Palpitations   Cough, shortness of breath, or difficulty breathing   Weakness or fainting   If you think you have an emergency, CALL 911 . SEDATION / ANALGESIA INFORMATION / Sudha Kerns 85 have received the sedation/analgesia medication during your visit    Sedation/analgesia is used during short medical procedures under controlled supervision. The medication will produce a strong relaxation.  You will be able to hear, speak and follow instructions, but your memory and alertness will be decreased. You will be able to swallow and breathe on your own. During sedation/analgesia your blood pressure, heart and breathing will be watched closely. After the procedure, you may not remember what was said or done. You may have the following effects from the medication. \" Drowsiness, dizziness, sleepiness or confusion. \" Difficulty remembering or delayed reaction times. \" Loss of fine muscle control or difficulty with your balance especially while walking. \" Difficulty focusing or blurred vision. You may not be aware of slight changes in your behavior and/or your reaction time because of the medication used during the procedure. Therefore you should follow these instructions. \" Have someone responsible help you with your care. \" Do not drive for 24 hours. \" Do not operate equipment for 24 hours (lawnmowers, power tools, kitchen accessories, stove). \" Do not drink any alcoholic beverages for a minimum of 24 hours. \" Do not make important personal, legal or business decisions for 24 hours. \" You may experience dizziness or lightheadedness. Move slowly and carefully, do not make sudden position changes. \" Drink extra amounts of fluids today. \" Increase your diet as tolerated (unless you have received specific instructions from your doctor). \" If you feel nauseated, continue with liquids until the nausea is gone. \" Notify your physician if you have not urinated within 8 hours after the procedure. \" Resume your medications unless otherwise instructed. Coronary artery disease (CAD) occurs when plaque builds up in the arteries that bring oxygen-rich blood to your heart. Plaque is a fatty substance made of cholesterol, calcium, and other substances in the blood. This process is called hardening of the arteries, or atherosclerosis. What happens when you have coronary artery disease? Plaque may narrow the coronary arteries. Narrowed arteries cause poor blood flow.  This can lead to angina you are having problems. It's also a good idea to know your test results and keep a list of the medicines you take. Where can you learn more? Go to https://andria.[employer] (formerly [employer]). org and sign in to your [employer] account. Enter [phone] in the Bambuser box to learn more about Learning About Coronary Artery Disease (CAD).     If you do not have an account, please click on the Sign Up Now link.

## 2022-09-23 NOTE — H&P
[facility]  Procedure History and Physical Update          Patient Name: Sharmin Armstrong  MRN:    [id number]  YOB: 1965  Date of evaluation:  9/23/2022    Procedure:    Cardiac cath +/- PCI    Indication for procedure:  Abnormal stress test      Please refer to the office note completed by Dr. Leena Christianson on 8/23/22 in the medical record and note that:    [x] I have examined the patient and reviewed the H&P/Consult and there are no changes to be made to the assessment or plan. [] I have examined the patient and reviewed the H&P/Consult and have noted the following changes:    No past medical history on file. Past Surgical History:   Procedure Laterality Date    COLONOSCOPY  11/01/2019    HAND SURGERY Right 2/24/2021    INCISION AND DRAINAGE TENDON SHEATH RIGHT FINGER performed by Deepak Garcia MD at [facility] OR       No family history on file. No Known Allergies    Prior to Admission medications    Medication Sig Start Date End Date Taking? Authorizing Provider   aspirin EC 81 MG EC tablet Take 1 tablet by mouth daily 8/23/22   Leena Christianson MD   sildenafil (VIAGRA) 50 MG tablet Take 1 tablet by mouth daily as needed for Erectile Dysfunction 8/8/22 9/7/22  Luis E Hill, DO   varenicline (CHANTIX) 1 MG tablet Take 1 tablet by mouth in the morning and 1 tablet before bedtime. 8/8/22 10/7/22  Jaci Read, DO   omeprazole (PRILOSEC) 20 MG delayed release capsule Take 20 mg by mouth in the morning. Historical Provider, MD         There were no vitals filed for this visit. Constitutional and General Appearance:   alert, cooperative, no distress and appears stated age  [de-identified]:  PERRL, EOMI  Respiratory:  Normal excursion and expansion without use of accessory muscles  Resp Auscultation:  Good respiratory effort. No for increased work of breathing. On auscultation: clear to auscultation bilaterally  Cardiovascular:  Regular rate and rhythm.   S1/S2  No murmurs. The apical impulse is not displaced  Abdomen:  Soft  Bowel sounds present  Non-tender to palpation  Extremities:  No cyanosis or clubbing  Lower extremity edema: No.  Skin:  Warm and dry  Neurological:  Alert and oriented. Moves all extremities well    Data:     ECHO 8/2/2016   Normal left ventricular size and function are noted. Mild diastolic dysfunction. Otherwise normal two-dimensional, Doppler and color flow echocardiogram.     Stress test   8/23/22    Interpretation:  1. Good functional/exercise capacity. Normal blood pressure response to  exercise. No significant ST-T abnormality to suggest ischemia. 2. Nuclear scan report to follow. IMPRESSION:  1. Large area of fixed/matching perfusion defect in inferior wall  suggesting infarct. 2. No definitive exercise perfusion defect to suggest ischemia. 3. Normal left ventricular ejection fraction. Impression:   Abnormal stress test   Coronary artery calcification   GERD  Smoker    Plan:  Proceed with planned procedure. Further orders to follow. Risks, benefits, and alternatives of cardiac catheterization were discussed, in detail, with patient. Risks include, but not limited to, bleeding, requiring blood transfusion, vascular complication requiring surgery, renal failure with need of dialysis, CVA, MI, death and anesthesia complications including intubation were discussed. Patient verbalized understanding and agreed to proceed with the procedure understanding the above risks and alternatives to the procedure. Risks, benefits, alternatives, and details discussed extensively. Accepts and consents. Electronically signed on 09/23/22 at 7:57 AM by:      Diogenes Campos MD. PGY- 4  Fellow, cardiovascular disease   [facility], [place], [place]  9/23/2022, 1:35 PM      Attending Physician Statement  I have discussed the care of [facility], including pertinent history and exam findings,  with the cardiology fellow/resident.  I have seen and examined the patient and the key elements of all parts of the encounter have been performed by me. I have completed at least one if not all key elements of the E/M (history, physical exam, and MDM).         Salvador Pierce MD, [facility]

## 2022-09-23 NOTE — PROGRESS NOTES
Patient admitted, consent signed and questions answered. Patient ready for procedure. Call light to reach with side rails up 2 of 2. Bilat groin and right wrist hairs clipped with writer and Angela present. Wife at bedside with patient. History and physical needing update.

## 2022-09-23 NOTE — OP NOTE
[facility]  Cardiac catheterization note        Date:   9/23/2022  Patient name: Jenny Cabello  Date of admission:  9/23/2022  9:50 AM  MRN:   [id number]  YOB: 1965    Operators:  Netta Gross MD (Attending Physician)     Pre Procedure Conscious Sedation Data:    ASA Class:    [] I [x] II [] III [] IV    Mallampati Class:  [] I [x] II [] III [] IV    Indications:   - Recurrent chest pain       - Abnormal nuclear perfusion test       - Coronary risk factors     Procedure:   Left heart catheterization   Selective left and right coronary angiography   Left ventriculography       Access:  [] Femoral  [x] Radial  artery       [x] Right  [] Left    Procedure: After informed consent was obtained with explanation of the risks and benefits, patient was brought to the cath lab. The access area was prepped and draped in sterile fashion. 1% lidocaine was used for local block. The artery was cannulated with 6  Fr sheath with brisk arterial blood return. The side port was frequently flushed and aspirated with normal saline. Findings:  Angiographic Findings        Cardiac Arteries and Lesion Findings       LMCA: Normal 0% stenosis. LAD: Mid diffuse 40% stenosis with haziness. D1 is large and patent with mild disease     LCx: Normal 0% stenosis. OM is large and normal     RCA: Mid eccentric 40% stenosis   RPDA/RPL are patent with mild disease      Coronary Tree        Dominance: Right       LV Analysis   LV function assessed as:Normal.   Ejection Fraction   +----------------------------------------------------------------------+---+   ! Method                                                                ! EF%! +----------------------------------------------------------------------+---+   ! LV gram                                                               !55 !   +----------------------------------------------------------------------+---+     Estimated Blood Loss:            [x] Minimal 10 cc   [] Minimal < 25 cc      [] Moderate 25-50 cc         [] >50 cc      Conclusions:    1. Moderate Nonobstructive CAD    2. Normal LV systolic function. Recommendations    Routine Post Diagnostic Cath orders.     Medical therapy with ASA and statin    Aggressive risk factor modifications including smoking cessation       Clari Mock MD. PGY- 4  Fellow, cardiovascular disease   [place], [place]  9/23/2022, 1:37 PM    Attending Physician       Marylene Pfeiffer MD, [facility]

## 2022-11-08 ENCOUNTER — OFFICE VISIT (OUTPATIENT)
Dept: FAMILY MEDICINE CLINIC | Age: 57
End: 2022-11-08
Payer: COMMERCIAL

## 2022-11-08 VITALS
WEIGHT: 176.2 LBS | HEART RATE: 84 BPM | BODY MASS INDEX: 24.67 KG/M2 | HEIGHT: 71 IN | OXYGEN SATURATION: 98 % | SYSTOLIC BLOOD PRESSURE: 115 MMHG | RESPIRATION RATE: 16 BRPM | DIASTOLIC BLOOD PRESSURE: 76 MMHG

## 2022-11-08 DIAGNOSIS — Z23 NEED FOR HEPATITIS B VACCINATION: Primary | ICD-10-CM

## 2022-11-08 DIAGNOSIS — Z87.891 PERSONAL HISTORY OF TOBACCO USE, PRESENTING HAZARDS TO HEALTH: ICD-10-CM

## 2022-11-08 DIAGNOSIS — Z00.00 ENCOUNTER FOR WELL ADULT EXAM WITHOUT ABNORMAL FINDINGS: ICD-10-CM

## 2022-11-08 PROCEDURE — 90471 IMMUNIZATION ADMIN: CPT

## 2022-11-08 PROCEDURE — 99396 PREV VISIT EST AGE 40-64: CPT

## 2022-11-08 PROCEDURE — 90746 HEPB VACCINE 3 DOSE ADULT IM: CPT

## 2022-11-08 NOTE — PROGRESS NOTES
Joselito Cotter (:  1965) is a 62 y.o. male,Established patient, here for evaluation of the following chief complaint(s):  Establish Care (Pt is here for a 3 mo f/u. He would like his Hep B shot. He has no other concerns. )         ASSESSMENT/PLAN:  {There are no diagnoses linked to this encounter. (Refresh or delete this SmartLink)}    No follow-ups on file. Subjective   SUBJECTIVE/OBJECTIVE:  HPI    Review of Systems       Objective   Physical Exam       {Time Documentation Optional:[id number]}      An electronic signature was used to authenticate this note.     --Semaj Webster, MIRYAM - CNP

## 2022-11-09 ASSESSMENT — ENCOUNTER SYMPTOMS
SHORTNESS OF BREATH: 0
COUGH: 0
EYE ITCHING: 0
COLOR CHANGE: 0
CHEST TIGHTNESS: 0
DIARRHEA: 0
BACK PAIN: 0
SINUS PRESSURE: 0
SINUS PAIN: 0
RHINORRHEA: 0
CONSTIPATION: 0
EYE DISCHARGE: 0
ABDOMINAL PAIN: 0
WHEEZING: 0
NAUSEA: 0

## 2022-11-09 NOTE — PATIENT INSTRUCTIONS
Quitting Tobacco: Care Instructions  Quitting tobacco is much harder than simply changing a habit. Nicotine cravings make it hard to quit, but you can do it. Your doctor will help you set up the plan that best meets your needs. You will miss the nicotine at first. You may feel short-tempered and grumpy. You may have trouble sleeping or thinking clearly. The urge to use tobacco may continue for a time. Combining tools can raise your chances of success. You can use medicine along with counseling. And you can join a quit-tobacco program, such as the American Lung Association's Freedom from Smoking program.    Get support. Reach out to family and friends, and find a counselor to help you quit. Join a support group, such as Nicotine Anonymous. Go to www.smokefree. gov to sign up for text messaging support. Talk to your doctor or pharmacist about medicines that can help you quit. Medicines can help with cravings and withdrawal symptoms. There are several over-the-counter choices, such as nicotine patches, gum, and lozenges. After you quit, do not use tobacco again, not even once. Get rid of all tobacco products and anything that reminds you of tobacco, such as ashtrays. Avoid things that make you reach for tobacco.  Change your daily routine. Take a different route to work, or eat a meal in a different place. Try to cut down on stress. Find ways to calm yourself, such as taking a hot bath or doing deep breathing exercises. Eat a healthy diet, and get regular exercise. Having healthy habits may help you quit using tobacco.     Don't give up on quitting if you use tobacco again. Most people quit and restart a few times before they quit for good. Follow-up care is a key part of your treatment and safety. Be sure to make and go to all appointments, and call your doctor if you are having problems. It's also a good idea to know your test results and keep a list of the medicines you take.   Where can you learn more? Go to https://chpepiceweb.healthYodlee. org and sign in to your [employer] account. Enter D624 in the [facility] box to learn more about \"Quitting Tobacco: Care Instructions. \"     If you do not have an account, please click on the \"Sign Up Now\" link. Current as of: November 8, 2021               Content Version: 13.4  © 2006-2022 [employer]. Care instructions adapted under license by [facility] ([facility]). If you have questions about a medical condition or this instruction, always ask your healthcare professional. [place] [address] any warranty or liability for your use of this information. Well Visit, Men 48 to 72: Care Instructions  Overview     Well visits can help you stay healthy. Your doctor has checked your overall health and may have suggested ways to take good care of yourself. Your doctor also may have recommended tests. At home, you can help prevent illness with healthy eating, regular exercise, and other steps. Follow-up care is a key part of your treatment and safety. Be sure to make and go to all appointments, and call your doctor if you are having problems. It's also a good idea to know your test results and keep a list of the medicines you take. How can you care for yourself at home? Get screening tests that you and your doctor decide on. Screening helps find diseases before any symptoms appear. Eat healthy foods. Choose fruits, vegetables, whole grains, protein, and low-fat dairy foods. Limit fat, especially saturated fat. Reduce salt in your diet. Limit alcohol. Have no more than 2 drinks a day or 14 drinks a week. Get at least 30 minutes of exercise on most days of the week. Walking is a good choice. You also may want to do other activities, such as running, swimming, cycling, or playing tennis or team sports. Reach and stay at a healthy weight.  This will lower your risk for many problems, such as obesity, diabetes, heart disease, and high blood pressure. Do not smoke. Smoking can make health problems worse. If you need help quitting, talk to your doctor about stop-smoking programs and medicines. These can increase your chances of quitting for good. Care for your mental health. It is easy to get weighed down by worry and stress. Learn strategies to manage stress, like deep breathing and mindfulness, and stay connected with your family and community. If you find you often feel sad or hopeless, talk with your doctor. Treatment can help. Talk to your doctor about whether you have any risk factors for sexually transmitted infections (STIs). You can help prevent STIs if you wait to have sex with a new partner (or partners) until you've each been tested for STIs. It also helps if you use condoms (male or female condoms) and if you limit your sex partners to one person who only has sex with you. Vaccines are available for some STIs. If it's important to you to prevent pregnancy with your partner, talk with your doctor about birth control options that might be best for you. If you think you may have a problem with alcohol or drug use, talk to your doctor. This includes prescription medicines (such as amphetamines and opioids) and illegal drugs (such as cocaine and methamphetamine). Your doctor can help you figure out what type of treatment is best for you. Protect your skin from too much sun. When you're outdoors from 10 a.m. to 4 p.m., stay in the shade or cover up with clothing and a hat with a wide brim. Wear sunglasses that block UV rays. Even when it's cloudy, put broad-spectrum sunscreen (SPF 30 or higher) on any exposed skin. See a dentist one or two times a year for checkups and to have your teeth cleaned. Wear a seat belt in the car. When should you call for help? Watch closely for changes in your health, and be sure to contact your doctor if you have any problems or symptoms that concern you.   Where can you learn more?  Go to https://chpepiceweb.[email or web address] org and sign in to your [employer] account. Enter G238 in the [facility] box to learn more about \"Well Visit, Men 48 to 72: Care Instructions. \"     If you do not have an account, please click on the \"Sign Up Now\" link. Current as of: June 6, 2022               Content Version: 13.4  © 2006-2022 Healthwise, Incorporated. Care instructions adapted under license by [facility] ([facility]). If you have questions about a medical condition or this instruction, always ask your healthcare professional. [place] [address] any warranty or liability for your use of this information.

## 2022-11-09 NOTE — PROGRESS NOTES
Well Adult Note  Name: Lesly Ashford Date: 2022   MRN: [id number] Sex: Male   Age: 62 y.o. Ethnicity: Non- / Non    : 1965 Race: White (non-)      Velma Shook is here for well adult exam.  History:  Erectile dysfunction, GERD, coronary artery disease seen on heart cath in 2022      Review of Systems   Constitutional:  Negative for chills, fatigue, fever and unexpected weight change. HENT:  Negative for congestion, ear pain, rhinorrhea, sinus pressure and sinus pain. Eyes:  Negative for discharge, itching and visual disturbance. Respiratory:  Negative for cough, chest tightness, shortness of breath and wheezing. Cardiovascular:  Negative for chest pain, palpitations and leg swelling. Gastrointestinal:  Negative for abdominal pain, constipation, diarrhea and nausea. Endocrine: Negative for cold intolerance, heat intolerance, polydipsia and polyphagia. Genitourinary:  Negative for dysuria, flank pain and frequency. Musculoskeletal:  Negative for arthralgias, back pain and myalgias. Skin:  Negative for color change. Allergic/Immunologic: Negative for environmental allergies and food allergies. Neurological:  Negative for dizziness, weakness, light-headedness, numbness and headaches. Psychiatric/Behavioral:  Negative for agitation, behavioral problems and confusion. The patient is not nervous/anxious. No Known Allergies      Prior to Visit Medications    Medication Sig Taking? Authorizing Provider   aspirin EC 81 MG EC tablet Take 1 tablet by mouth daily Yes Erika Coronado MD   sildenafil (VIAGRA) 50 MG tablet Take 1 tablet by mouth daily as needed for Erectile Dysfunction Yes Jaci Read DO   varenicline (CHANTIX) 1 MG tablet Take 1 tablet by mouth in the morning and 1 tablet before bedtime.  Yes Jaci Read DO   omeprazole (PRILOSEC) 20 MG delayed release capsule Take 20 mg by mouth daily as needed Yes Historical MD Jose         Past Medical History:   Diagnosis Date    Abnormal cardiovascular stress test 08/23/2022    Large area of fixed/matching perfusion defect in inferior wall suggesting infarct    Emphysema lung ([address]     Smoker        Past Surgical History:   Procedure Laterality Date    CARDIAC CATHETERIZATION  09/23/2022    DR [facility]    COLONOSCOPY  11/01/2019    HAND SURGERY Right 02/24/2021    INCISION AND DRAINAGE TENDON SHEATH RIGHT FINGER performed by Chelsea Zhao MD at [address] [place] [address] reviewed. No pertinent family history. Social History     Tobacco Use    Smoking status: Every Day     Packs/day: 2.50     Years: 31.00     Pack years: 77.50     Types: Cigarettes     Start date: 7/19/1991    Smokeless tobacco: Never   Substance Use Topics    Drug use: Yes     Types: Marijuana Katheren Ling)     Comment: gummies used prn to sleep       Objective   /76 (Site: Left Upper Arm, Position: Sitting, Cuff Size: Medium Adult)   Pulse 84   Resp 16   Ht 5' 11\" (1.803 m)   Wt 176 lb 3.2 oz (79.9 kg)   SpO2 98%   BMI 24.57 kg/m²   Wt Readings from Last 3 Encounters:   11/08/22 176 lb 3.2 oz (79.9 kg)   09/23/22 173 lb (78.5 kg)   08/23/22 171 lb 8 oz (77.8 kg)     There were no vitals filed for this visit. Physical Exam  Vitals and nursing note reviewed. Constitutional:       General: He is not in acute distress. Appearance: Normal appearance. He is not ill-appearing. HENT:      Head: Normocephalic and atraumatic. Right Ear: Tympanic membrane and external ear normal.      Left Ear: Tympanic membrane and external ear normal.      Nose: Nose normal. No congestion or rhinorrhea. Mouth/Throat:      Mouth: Mucous membranes are moist.      Pharynx: Oropharynx is clear. No posterior oropharyngeal erythema. Eyes:      Pupils: Pupils are equal, round, and reactive to light. Cardiovascular:      Rate and Rhythm: Normal rate and regular rhythm. Pulses: Normal pulses.       Heart sounds: Normal heart sounds. Pulmonary:      Effort: Pulmonary effort is normal.      Breath sounds: Normal breath sounds. No wheezing or rhonchi. Abdominal:      General: Bowel sounds are normal.      Palpations: There is no mass. Tenderness: There is no abdominal tenderness. Musculoskeletal:         General: No swelling or tenderness. Normal range of motion. Cervical back: Normal range of motion. No rigidity or tenderness. Skin:     General: Skin is warm and dry. Capillary Refill: Capillary refill takes less than 2 seconds. Coloration: Skin is not pale. Findings: No erythema. Neurological:      General: No focal deficit present. Mental Status: He is alert and oriented to person, place, and time. Psychiatric:         Mood and Affect: Mood normal.         Behavior: Behavior normal.         Thought Content: Thought content normal.         Judgment: Judgment normal.         Assessment   Plan   1. Need for hepatitis B vaccination  -     Hep B, ENGERIX-B, (age 21 yrs+), IM, 1mL, 3-dose  2. Personal history of tobacco use, presenting hazards to health  -     NE TOBACCO USE CESSATION INTERMEDIATE 3-10 MINUTES [74169]  3.  Encounter for well adult exam without abnormal findings         Personalized Preventive Plan   Current Health Maintenance Status  Immunization History   Administered Date(s) Administered    Hepatitis B Adult (Engerix-B) 10/01/2019, 11/14/2019, 11/08/2022        Health Maintenance   Topic Date Due    COVID-19 Vaccine (1) 03/08/2023 (Originally 1965)    Pneumococcal 0-64 years Vaccine (1 - PCV) 03/09/2023 (Originally 6/5/1971)    Shingles vaccine (1 of 2) 03/24/2023 (Originally 6/5/2015)    Flu vaccine (1) 11/08/2023 (Originally 8/1/2022)    DTaP/Tdap/Td vaccine (1 - Tdap) 03/06/2030 (Originally 6/5/1984)    Low dose CT lung screening  06/10/2023    A1C test (Diabetic or Prediabetic)  08/08/2023    Depression Screen  08/08/2023    Lipids  08/01/2027 Colorectal Cancer Screen  11/01/2029    Hepatitis C screen  Completed    HIV screen  Completed    Hepatitis A vaccine  Aged Out    Hib vaccine  Aged Out    Meningococcal (ACWY) vaccine  Aged Out     Recommendations for [employer] Due: see orders and patient instructions/AVS.    Return in about 6 months (around 5/8/2023), or if symptoms worsen or fail to improve.

## 2023-01-16 ENCOUNTER — APPOINTMENT (OUTPATIENT)
Dept: GENERAL RADIOLOGY | Age: 58
End: 2023-01-16
Payer: COMMERCIAL

## 2023-01-16 ENCOUNTER — HOSPITAL ENCOUNTER (EMERGENCY)
Age: 58
Discharge: HOME | End: 2023-01-16
Attending: EMERGENCY MEDICINE
Payer: COMMERCIAL

## 2023-01-16 VITALS
SYSTOLIC BLOOD PRESSURE: 152 MMHG | HEART RATE: 69 BPM | DIASTOLIC BLOOD PRESSURE: 92 MMHG | OXYGEN SATURATION: 94 % | WEIGHT: 180 LBS | HEIGHT: 71 IN | BODY MASS INDEX: 25.2 KG/M2 | RESPIRATION RATE: 18 BRPM | TEMPERATURE: 98.6 F

## 2023-01-16 DIAGNOSIS — F41.1 ANXIETY STATE: ICD-10-CM

## 2023-01-16 DIAGNOSIS — R07.89 ATYPICAL CHEST PAIN: Primary | ICD-10-CM

## 2023-01-16 LAB
ABSOLUTE EOS #: 0.06 K/UL (ref 0–0.4)
ABSOLUTE LYMPH #: 2.02 K/UL (ref 1–4.8)
ABSOLUTE MONO #: 0.58 K/UL (ref 0.1–1.2)
AMPHETAMINE SCREEN URINE: NEGATIVE
ANION GAP SERPL CALCULATED.3IONS-SCNC: 12 MMOL/L (ref 9–17)
BARBITURATE SCREEN URINE: NEGATIVE
BASOPHILS # BLD: 1 % (ref 0–2)
BASOPHILS ABSOLUTE: 0.03 K/UL (ref 0–0.2)
BENZODIAZEPINE SCREEN, URINE: NEGATIVE
BUN BLDV-MCNC: 6 MG/DL (ref 6–20)
CALCIUM SERPL-MCNC: 9.1 MG/DL (ref 8.6–10.4)
CANNABINOID SCREEN URINE: NEGATIVE
CHLORIDE BLD-SCNC: 99 MMOL/L (ref 98–107)
CO2: 26 MMOL/L (ref 20–31)
COCAINE METABOLITE, URINE: NEGATIVE
CREAT SERPL-MCNC: 0.77 MG/DL (ref 0.7–1.2)
D-DIMER QUANTITATIVE: 0.2 MG/L FEU
EOSINOPHILS RELATIVE PERCENT: 1 % (ref 1–4)
ETHANOL PERCENT: <0.01 %
ETHANOL: <10 MG/DL
FENTANYL URINE: NEGATIVE
GFR SERPL CREATININE-BSD FRML MDRD: >60 ML/MIN/1.73M2
GLUCOSE BLD-MCNC: 97 MG/DL (ref 70–99)
HCT VFR BLD CALC: 45.5 % (ref 41–53)
HEMOGLOBIN: 15.4 G/DL (ref 13.5–17.5)
LYMPHOCYTES # BLD: 31 % (ref 24–44)
MCH RBC QN AUTO: 32.2 PG (ref 26–34)
MCHC RBC AUTO-ENTMCNC: 33.8 G/DL (ref 31–37)
MCV RBC AUTO: 95 FL (ref 80–100)
METHADONE SCREEN, URINE: NEGATIVE
MONOCYTES # BLD: 9 % (ref 2–11)
OPIATES, URINE: NEGATIVE
OXYCODONE SCREEN URINE: NEGATIVE
PDW BLD-RTO: 13.1 % (ref 12.5–15.4)
PHENCYCLIDINE, URINE: NEGATIVE
PLATELET # BLD: 218 K/UL (ref 140–450)
PMV BLD AUTO: 10.2 FL (ref 8–14)
POTASSIUM SERPL-SCNC: 4 MMOL/L (ref 3.7–5.3)
RBC # BLD: 4.79 M/UL (ref 4.5–5.9)
SEG NEUTROPHILS: 58 % (ref 36–66)
SEGMENTED NEUTROPHILS ABSOLUTE COUNT: 3.9 K/UL (ref 1.8–7.7)
SODIUM BLD-SCNC: 137 MMOL/L (ref 135–144)
TEST INFORMATION: NORMAL
TROPONIN, HIGH SENSITIVITY: 9 NG/L (ref 0–22)
TROPONIN, HIGH SENSITIVITY: 9 NG/L (ref 0–22)
WBC # BLD: 6.6 K/UL (ref 3.5–11)

## 2023-01-16 PROCEDURE — 84484 ASSAY OF TROPONIN QUANT: CPT

## 2023-01-16 PROCEDURE — 93005 ELECTROCARDIOGRAM TRACING: CPT | Performed by: EMERGENCY MEDICINE

## 2023-01-16 PROCEDURE — 99285 EMERGENCY DEPT VISIT HI MDM: CPT

## 2023-01-16 PROCEDURE — 6370000000 HC RX 637 (ALT 250 FOR IP): Performed by: EMERGENCY MEDICINE

## 2023-01-16 PROCEDURE — 80048 BASIC METABOLIC PNL TOTAL CA: CPT

## 2023-01-16 PROCEDURE — 71045 X-RAY EXAM CHEST 1 VIEW: CPT

## 2023-01-16 PROCEDURE — 85025 COMPLETE CBC W/AUTO DIFF WBC: CPT

## 2023-01-16 PROCEDURE — 85379 FIBRIN DEGRADATION QUANT: CPT

## 2023-01-16 PROCEDURE — 36415 COLL VENOUS BLD VENIPUNCTURE: CPT

## 2023-01-16 PROCEDURE — G0480 DRUG TEST DEF 1-7 CLASSES: HCPCS

## 2023-01-16 PROCEDURE — 80307 DRUG TEST PRSMV CHEM ANLYZR: CPT

## 2023-01-16 RX ORDER — LORAZEPAM 1 MG/1
1 TABLET ORAL ONCE
Status: COMPLETED | OUTPATIENT
Start: 2023-01-16 | End: 2023-01-16

## 2023-01-16 RX ADMIN — LORAZEPAM 1 MG: 1 TABLET ORAL at 21:09

## 2023-01-16 ASSESSMENT — ENCOUNTER SYMPTOMS
VOMITING: 0
SHORTNESS OF BREATH: 0
BACK PAIN: 0
COUGH: 0
WHEEZING: 0
SORE THROAT: 0
ABDOMINAL PAIN: 0
DIARRHEA: 0
NAUSEA: 0

## 2023-01-16 ASSESSMENT — PAIN DESCRIPTION - ORIENTATION: ORIENTATION: RIGHT

## 2023-01-16 ASSESSMENT — PAIN SCALES - GENERAL: PAINLEVEL_OUTOF10: 3

## 2023-01-16 ASSESSMENT — PAIN DESCRIPTION - FREQUENCY: FREQUENCY: CONTINUOUS

## 2023-01-16 ASSESSMENT — PAIN DESCRIPTION - DESCRIPTORS: DESCRIPTORS: OTHER (COMMENT)

## 2023-01-16 ASSESSMENT — HEART SCORE: ECG: 0

## 2023-01-16 ASSESSMENT — PAIN DESCRIPTION - LOCATION: LOCATION: CHEST

## 2023-01-16 ASSESSMENT — PAIN DESCRIPTION - PAIN TYPE: TYPE: ACUTE PAIN
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
## 2023-01-17 ENCOUNTER — OFFICE VISIT (OUTPATIENT)
Dept: FAMILY MEDICINE CLINIC | Age: 58
End: 2023-01-17
Payer: COMMERCIAL

## 2023-01-17 VITALS
HEIGHT: 71 IN | HEART RATE: 98 BPM | DIASTOLIC BLOOD PRESSURE: 86 MMHG | OXYGEN SATURATION: 97 % | SYSTOLIC BLOOD PRESSURE: 152 MMHG | WEIGHT: 181.5 LBS | BODY MASS INDEX: 25.41 KG/M2

## 2023-01-17 DIAGNOSIS — N52.9 ERECTILE DYSFUNCTION, UNSPECIFIED ERECTILE DYSFUNCTION TYPE: ICD-10-CM

## 2023-01-17 DIAGNOSIS — F32.A ANXIETY AND DEPRESSION: Primary | ICD-10-CM

## 2023-01-17 DIAGNOSIS — F41.9 ANXIETY AND DEPRESSION: Primary | ICD-10-CM

## 2023-01-17 PROCEDURE — 99214 OFFICE O/P EST MOD 30 MIN: CPT

## 2023-01-17 RX ORDER — SILDENAFIL 50 MG/1
50 TABLET, FILM COATED ORAL DAILY PRN
Qty: 30 TABLET | Refills: 1 | Status: SHIPPED | OUTPATIENT
Start: 2023-01-17 | End: 2023-01-18

## 2023-01-17 RX ORDER — ARIPIPRAZOLE 5 MG/1
5 TABLET ORAL DAILY
Qty: 30 TABLET | Refills: 1 | Status: SHIPPED | OUTPATIENT
Start: 2023-01-17 | End: 2023-01-20 | Stop reason: ALTCHOICE

## 2023-01-17 RX ORDER — HYDROXYZINE HYDROCHLORIDE 25 MG/1
25 TABLET, FILM COATED ORAL NIGHTLY
Qty: 30 TABLET | Refills: 0 | Status: SHIPPED | OUTPATIENT
Start: 2023-01-17 | End: 2023-02-16

## 2023-01-17 ASSESSMENT — PATIENT HEALTH QUESTIONNAIRE - PHQ9
SUM OF ALL RESPONSES TO PHQ QUESTIONS 1-9: 0
1. LITTLE INTEREST OR PLEASURE IN DOING THINGS: 0
SUM OF ALL RESPONSES TO PHQ9 QUESTIONS 1 & 2: 0
SUM OF ALL RESPONSES TO PHQ QUESTIONS 1-9: 0
2. FEELING DOWN, DEPRESSED OR HOPELESS: 0

## 2023-01-17 NOTE — PROGRESS NOTES
Nini Fenton (:  1965) is a 62 y.o. male,Established patient, here for evaluation of the following chief complaint(s):  Follow-up (Pt is here for a follow up after going to the [facility] in [facility] for panic attacks. )         ASSESSMENT/PLAN:  1. Anxiety and depression  Assessment & Plan:   Uncontrolled, changes made today: We will start Abilify 5 mg tablets once daily. Side effects this medication are discussed as well as information is given in after visit summary. Did pick Abilify due to patient's symptoms being more correlated to bipolar with saleem. We will start at 5 mg, see back in 1 month for titration of this. We will also start on Atarax 25 mg nightly to help with acute anxiety. Side effects this medication are discussed with both Blas Giraldo and his wife. They verbalized understanding  Orders:  -     ARIPiprazole (ABILIFY) 5 MG tablet; Take 1 tablet by mouth daily, Disp-30 tablet, R-1Normal  -     hydrOXYzine HCl (ATARAX) 25 MG tablet; Take 1 tablet by mouth nightly, Disp-30 tablet, R-0Normal  2. Erectile dysfunction, unspecified erectile dysfunction type  Assessment & Plan:   At goal, continue current medications and continue current treatment plan will put patient on sildenafil 20 mg tablets 1 to 2 tablets as needed daily 1 hour prior to sexual activity. Reports good efficacy with this medication, denies any hypotension, dizziness, near syncopal episodes with this. Is not currently on any nitrates that would cause unsafe drop in blood pressure. But this is discussed with Blas Giraldo. Orders:  -     sildenafil (REVATIO) 20 MG tablet; Take 2 tablets by mouth daily as needed (prior to sexual activity), Disp-60 tablet, R-0Normal      Return in about 1 month (around 2023). Subjective   SUBJECTIVE/OBJECTIVE:  Blas Giraldo is here today with his wife for follow-up visit. He had multiple visits for anxiety and \"panic attack\".   Currently he states he is doing a little better, denies any suicidal or homicidal ideations in office today. Review of Systems   Constitutional:  Negative for chills, fatigue, fever and unexpected weight change. HENT:  Negative for congestion, ear pain, rhinorrhea, sinus pressure and sinus pain. Eyes:  Negative for discharge, itching and visual disturbance. Respiratory:  Negative for cough, chest tightness, shortness of breath and wheezing. Cardiovascular:  Negative for chest pain, palpitations and leg swelling. Gastrointestinal:  Negative for abdominal pain, constipation, diarrhea and nausea. Endocrine: Negative for cold intolerance, heat intolerance, polydipsia and polyphagia. Genitourinary:  Negative for dysuria, flank pain and frequency. Musculoskeletal:  Negative for arthralgias, back pain and myalgias. Skin:  Negative for color change. Allergic/Immunologic: Negative for environmental allergies and food allergies. Neurological:  Negative for dizziness, weakness, light-headedness, numbness and headaches. Psychiatric/Behavioral:  Negative for agitation, behavioral problems, confusion, self-injury and suicidal ideas. The patient is hyperactive. The patient is not nervous/anxious. Objective   Physical Exam  Vitals and nursing note reviewed. Constitutional:       General: He is not in acute distress. Appearance: Normal appearance. He is not ill-appearing. HENT:      Head: Normocephalic and atraumatic. Right Ear: Tympanic membrane and external ear normal.      Left Ear: Tympanic membrane and external ear normal.      Nose: Nose normal. No congestion or rhinorrhea. Mouth/Throat:      Mouth: Mucous membranes are moist.      Pharynx: Oropharynx is clear. No posterior oropharyngeal erythema. Eyes:      Pupils: Pupils are equal, round, and reactive to light. Cardiovascular:      Rate and Rhythm: Normal rate and regular rhythm. Pulses: Normal pulses. Heart sounds: Normal heart sounds.    Pulmonary: Effort: Pulmonary effort is normal.      Breath sounds: Normal breath sounds. No wheezing or rhonchi. Abdominal:      General: Bowel sounds are normal.      Palpations: There is no mass. Tenderness: There is no abdominal tenderness. Musculoskeletal:         General: No swelling or tenderness. Normal range of motion. Cervical back: Normal range of motion. No rigidity or tenderness. Skin:     General: Skin is warm and dry. Capillary Refill: Capillary refill takes less than 2 seconds. Coloration: Skin is not pale. Findings: No erythema. Neurological:      General: No focal deficit present. Mental Status: He is alert and oriented to person, place, and time. Psychiatric:         Mood and Affect: Mood is anxious. Speech: Speech is rapid and pressured. Behavior: Behavior normal.         Thought Content: Thought content normal.         Judgment: Judgment normal.                An electronic signature was used to authenticate this note.     --MIRYAM Lopez - CNP
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
## 2023-01-18 PROBLEM — F32.A ANXIETY AND DEPRESSION: Status: ACTIVE | Noted: 2023-01-18

## 2023-01-18 PROBLEM — F41.9 ANXIETY AND DEPRESSION: Status: ACTIVE | Noted: 2023-01-18

## 2023-01-18 PROBLEM — N52.9 ERECTILE DYSFUNCTION: Status: ACTIVE | Noted: 2023-01-18

## 2023-01-18 LAB
EKG ATRIAL RATE: 78 BPM
EKG P AXIS: 73 DEGREES
EKG P-R INTERVAL: 134 MS
EKG Q-T INTERVAL: 344 MS
EKG QRS DURATION: 84 MS
EKG QTC CALCULATION (BAZETT): 392 MS
EKG R AXIS: 67 DEGREES
EKG T AXIS: 43 DEGREES
EKG VENTRICULAR RATE: 78 BPM

## 2023-01-18 RX ORDER — SILDENAFIL CITRATE 20 MG/1
40 TABLET ORAL DAILY PRN
Qty: 60 TABLET | Refills: 0 | Status: SHIPPED | OUTPATIENT
Start: 2023-01-18

## 2023-01-18 ASSESSMENT — ENCOUNTER SYMPTOMS
DIARRHEA: 0
SINUS PAIN: 0
EYE ITCHING: 0
RHINORRHEA: 0
SINUS PRESSURE: 0
SHORTNESS OF BREATH: 0
ABDOMINAL PAIN: 0
COUGH: 0
WHEEZING: 0
BACK PAIN: 0
NAUSEA: 0
CHEST TIGHTNESS: 0
CONSTIPATION: 0
COLOR CHANGE: 0
EYE DISCHARGE: 0

## 2023-01-18 NOTE — ASSESSMENT & PLAN NOTE
At goal, continue current medications and continue current treatment plan will put patient on sildenafil 20 mg tablets 1 to 2 tablets as needed daily 1 hour prior to sexual activity. Reports good efficacy with this medication, denies any hypotension, dizziness, near syncopal episodes with this. Is not currently on any nitrates that would cause unsafe drop in blood pressure. But this is discussed with Keenan Rai.

## 2023-01-18 NOTE — ASSESSMENT & PLAN NOTE
Uncontrolled, changes made today: We will start Abilify 5 mg tablets once daily. Side effects this medication are discussed as well as information is given in after visit summary. Did pick Abilify due to patient's symptoms being more correlated to bipolar with saleem. We will start at 5 mg, see back in 1 month for titration of this. We will also start on Atarax 25 mg nightly to help with acute anxiety. Side effects this medication are discussed with both Lennox Mars and his wife.   They verbalized understanding

## 2023-01-20 ENCOUNTER — TELEPHONE (OUTPATIENT)
Dept: FAMILY MEDICINE CLINIC | Age: 58
End: 2023-01-20

## 2023-01-20 DIAGNOSIS — F30.9 MANIC EPISODE (HCC): Primary | ICD-10-CM

## 2023-01-20 RX ORDER — QUETIAPINE FUMARATE 50 MG/1
50 TABLET, FILM COATED ORAL 2 TIMES DAILY
Qty: 60 TABLET | Refills: 3 | Status: SHIPPED | OUTPATIENT
Start: 2023-01-20

## 2023-01-20 NOTE — TELEPHONE ENCOUNTER
Wife called today to say that patient is not sleeping. Last night he slept 4 hours. He got up and wanted to move the couch out of the living room to turn it into an exercise room so he can sleep. He then went on to another thing and did not move the exercise equipment into the room. He has a new thing he wants to do every 2-3 minutes. He cannot focus. Wife says he is bouncing off the walls. He goes from one thing to the next. Now he is going to a casino alone. She relates it to starting Abilify possibly. She is at a loss as to what to do. He is not home. She called first call for help but they could not do anything because he is not suicidal.   What to do?   Her # is [phone]

## 2023-03-03 DIAGNOSIS — F30.9 MANIC EPISODE (HCC): ICD-10-CM

## 2023-03-03 RX ORDER — QUETIAPINE FUMARATE 50 MG/1
50 TABLET, FILM COATED ORAL 2 TIMES DAILY
Qty: 60 TABLET | Refills: 3 | Status: SHIPPED | OUTPATIENT
Start: 2023-03-03

## 2023-03-03 NOTE — TELEPHONE ENCOUNTER
Requested Prescriptions     Pending Prescriptions Disp Refills    QUEtiapine (SEROQUEL) 50 MG tablet 60 tablet 3     Sig: Take 1 tablet by mouth 2 times daily      Pt wife called stating that pt is out of this medication, and they are in [place]. Please send refill. Thanks!

## 2023-03-30 ENCOUNTER — NURSE ONLY (OUTPATIENT)
Dept: FAMILY MEDICINE CLINIC | Age: 58
End: 2023-03-30

## 2023-03-31 NOTE — PROGRESS NOTES
Patient came in for Hep B injection has had 3 doses he is requesting maybe a titer check to see if he needs

## 2023-04-21 DIAGNOSIS — F41.9 ANXIETY AND DEPRESSION: ICD-10-CM

## 2023-04-21 DIAGNOSIS — F32.A ANXIETY AND DEPRESSION: ICD-10-CM

## 2023-04-21 RX ORDER — HYDROXYZINE HYDROCHLORIDE 25 MG/1
25 TABLET, FILM COATED ORAL NIGHTLY
Qty: 30 TABLET | Refills: 0 | Status: SHIPPED | OUTPATIENT
Start: 2023-04-21 | End: 2023-05-21

## 2023-04-21 NOTE — TELEPHONE ENCOUNTER
Cinthya Walker is requesting a refill on the following medication(s):  Requested Prescriptions     Pending Prescriptions Disp Refills    hydrOXYzine HCl (ATARAX) 25 MG tablet [Pharmacy Med Name: HYDROXYZINE HCL 25 MG TABLET] 30 tablet 0     Sig: take 1 tablet by mouth nightly       Last Visit Date (If Applicable):  7/94/1756    Next Visit Date:    5/10/2023

## 2023-05-11 ENCOUNTER — TELEPHONE (OUTPATIENT)
Dept: ONCOLOGY | Age: 58
End: 2023-05-11

## 2023-05-11 DIAGNOSIS — Z87.891 PERSONAL HISTORY OF NICOTINE DEPENDENCE: Primary | ICD-10-CM

## 2023-06-22 ENCOUNTER — OFFICE VISIT (OUTPATIENT)
Dept: FAMILY MEDICINE CLINIC | Age: 58
End: 2023-06-22
Payer: COMMERCIAL

## 2023-06-22 ENCOUNTER — TELEPHONE (OUTPATIENT)
Dept: FAMILY MEDICINE CLINIC | Age: 58
End: 2023-06-22

## 2023-06-22 VITALS
HEART RATE: 68 BPM | OXYGEN SATURATION: 98 % | WEIGHT: 161 LBS | BODY MASS INDEX: 22.45 KG/M2 | DIASTOLIC BLOOD PRESSURE: 90 MMHG | SYSTOLIC BLOOD PRESSURE: 158 MMHG

## 2023-06-22 DIAGNOSIS — F32.A ANXIETY AND DEPRESSION: ICD-10-CM

## 2023-06-22 DIAGNOSIS — F31.10 BIPOLAR I DISORDER WITH MANIA (HCC): ICD-10-CM

## 2023-06-22 DIAGNOSIS — Z09 HOSPITAL DISCHARGE FOLLOW-UP: Primary | ICD-10-CM

## 2023-06-22 DIAGNOSIS — F30.9 MANIC EPISODE (HCC): ICD-10-CM

## 2023-06-22 DIAGNOSIS — F41.9 ANXIETY AND DEPRESSION: ICD-10-CM

## 2023-06-22 PROCEDURE — 99214 OFFICE O/P EST MOD 30 MIN: CPT

## 2023-06-22 PROCEDURE — 1111F DSCHRG MED/CURRENT MED MERGE: CPT

## 2023-06-22 RX ORDER — OLANZAPINE 10 MG/1
10 TABLET ORAL NIGHTLY
Qty: 30 TABLET | Refills: 0 | Status: SHIPPED | OUTPATIENT
Start: 2023-06-22

## 2023-06-22 RX ORDER — QUETIAPINE FUMARATE 150 MG/1
TABLET, FILM COATED ORAL
COMMUNITY
Start: 2023-06-16 | End: 2023-06-22 | Stop reason: SINTOL

## 2023-06-22 SDOH — ECONOMIC STABILITY: HOUSING INSECURITY
IN THE LAST 12 MONTHS, WAS THERE A TIME WHEN YOU DID NOT HAVE A STEADY PLACE TO SLEEP OR SLEPT IN A SHELTER (INCLUDING NOW)?: NO

## 2023-06-22 SDOH — ECONOMIC STABILITY: FOOD INSECURITY: WITHIN THE PAST 12 MONTHS, YOU WORRIED THAT YOUR FOOD WOULD RUN OUT BEFORE YOU GOT MONEY TO BUY MORE.: NEVER TRUE

## 2023-06-22 SDOH — ECONOMIC STABILITY: INCOME INSECURITY: HOW HARD IS IT FOR YOU TO PAY FOR THE VERY BASICS LIKE FOOD, HOUSING, MEDICAL CARE, AND HEATING?: NOT HARD AT ALL

## 2023-06-22 SDOH — ECONOMIC STABILITY: FOOD INSECURITY: WITHIN THE PAST 12 MONTHS, THE FOOD YOU BOUGHT JUST DIDN'T LAST AND YOU DIDN'T HAVE MONEY TO GET MORE.: NEVER TRUE

## 2023-06-22 NOTE — TELEPHONE ENCOUNTER
Sister Norma Aranda called asking how long do they wait before they determine the Zyprexa is not working and need to take him back to [employer]?

## 2023-06-28 PROBLEM — F31.10 BIPOLAR I DISORDER WITH MANIA (HCC): Status: ACTIVE | Noted: 2023-06-28

## 2023-06-28 ASSESSMENT — ENCOUNTER SYMPTOMS
COUGH: 0
CHEST TIGHTNESS: 0
EYE ITCHING: 0
NAUSEA: 0
CONSTIPATION: 0
SINUS PRESSURE: 0
ABDOMINAL PAIN: 0
EYE DISCHARGE: 0
RHINORRHEA: 0
COLOR CHANGE: 0
DIARRHEA: 0
WHEEZING: 0
BACK PAIN: 0
SHORTNESS OF BREATH: 0
SINUS PAIN: 0

## 2023-07-14 DIAGNOSIS — F31.10 BIPOLAR I DISORDER WITH MANIA (HCC): ICD-10-CM

## 2023-07-14 DIAGNOSIS — F41.9 ANXIETY AND DEPRESSION: ICD-10-CM

## 2023-07-14 DIAGNOSIS — F30.9 MANIC EPISODE (HCC): ICD-10-CM

## 2023-07-14 DIAGNOSIS — F32.A ANXIETY AND DEPRESSION: ICD-10-CM

## 2023-07-14 RX ORDER — OLANZAPINE 10 MG/1
10 TABLET ORAL NIGHTLY
Qty: 30 TABLET | Refills: 0 | Status: SHIPPED | OUTPATIENT
Start: 2023-07-14

## 2023-07-14 NOTE — TELEPHONE ENCOUNTER
Pt PCP is out of office until 7/27/23 Please refill prescription.        Zoë Younger is requesting a refill on the following medication(s):  Requested Prescriptions     Pending Prescriptions Disp Refills    OLANZapine (ZYPREXA) 10 MG tablet [Pharmacy Med Name: OLANZAPINE 10 MG TABLET] 30 tablet 0     Sig: take 1 tablet by mouth nightly       Last Visit Date (If Applicable):  6/77/2766    Next Visit Date:    7/24/2023

## 2023-07-24 ENCOUNTER — OFFICE VISIT (OUTPATIENT)
Dept: FAMILY MEDICINE CLINIC | Age: 58
End: 2023-07-24
Payer: COMMERCIAL

## 2023-07-24 VITALS
SYSTOLIC BLOOD PRESSURE: 118 MMHG | OXYGEN SATURATION: 98 % | HEART RATE: 88 BPM | BODY MASS INDEX: 22.59 KG/M2 | DIASTOLIC BLOOD PRESSURE: 70 MMHG | WEIGHT: 162 LBS

## 2023-07-24 DIAGNOSIS — F31.12 BIPOLAR I DISORDER, MOST RECENT EPISODE (OR CURRENT) MANIC, MODERATE (HCC): ICD-10-CM

## 2023-07-24 DIAGNOSIS — Z09 HOSPITAL DISCHARGE FOLLOW-UP: Primary | ICD-10-CM

## 2023-07-24 PROCEDURE — 99213 OFFICE O/P EST LOW 20 MIN: CPT

## 2023-07-24 PROCEDURE — 1111F DSCHRG MED/CURRENT MED MERGE: CPT

## 2023-07-24 RX ORDER — OMEPRAZOLE 20 MG/1
20 CAPSULE, DELAYED RELEASE ORAL DAILY
COMMUNITY

## 2023-07-24 RX ORDER — QUETIAPINE FUMARATE 100 MG/1
TABLET, FILM COATED ORAL
COMMUNITY
Start: 2023-07-22

## 2023-07-24 RX ORDER — LITHIUM CARBONATE 600 MG/1
CAPSULE ORAL 2 TIMES DAILY WITH MEALS
COMMUNITY
Start: 2023-07-22

## 2023-07-24 ASSESSMENT — ENCOUNTER SYMPTOMS
DIARRHEA: 0
SINUS PRESSURE: 0
EYE DISCHARGE: 0
EYE ITCHING: 0
CONSTIPATION: 0
SHORTNESS OF BREATH: 0
SINUS PAIN: 0
ABDOMINAL PAIN: 0
COUGH: 0
COLOR CHANGE: 0
BACK PAIN: 0
RHINORRHEA: 0
NAUSEA: 0
WHEEZING: 0
CHEST TIGHTNESS: 0

## 2023-07-26 NOTE — ASSESSMENT & PLAN NOTE
At goal, continue current medications and continue current treatment plan today he is not displaying any tangential communication, able to hold attention without flights of ideas. States he is currently taking his medications as prescribed. On Seroquel 100 mg nightly as well as 600 mg of lithium twice daily with meals. Denies any homicidal suicidal ideations, denies any saleem associated symptoms such as difficulty sleeping, inappropriate spending. Does have follow-up visit with psych which she is encouraged to keep.

## 2023-09-07 ENCOUNTER — OFFICE VISIT (OUTPATIENT)
Dept: CARDIOLOGY | Age: 58
End: 2023-09-07
Payer: COMMERCIAL

## 2023-09-07 VITALS
DIASTOLIC BLOOD PRESSURE: 68 MMHG | BODY MASS INDEX: 22.59 KG/M2 | SYSTOLIC BLOOD PRESSURE: 120 MMHG | HEART RATE: 87 BPM | WEIGHT: 162 LBS

## 2023-09-07 DIAGNOSIS — I25.84 CORONARY ARTERY CALCIFICATION: Primary | ICD-10-CM

## 2023-09-07 DIAGNOSIS — I25.10 CORONARY ARTERY CALCIFICATION: Primary | ICD-10-CM

## 2023-09-07 PROCEDURE — 99214 OFFICE O/P EST MOD 30 MIN: CPT | Performed by: SURGERY

## 2023-09-07 PROCEDURE — 93000 ELECTROCARDIOGRAM COMPLETE: CPT | Performed by: SURGERY

## 2023-09-07 RX ORDER — OMEPRAZOLE 10 MG/1
CAPSULE, DELAYED RELEASE ORAL PRN
COMMUNITY
Start: 2019-10-23

## 2023-09-07 RX ORDER — ASPIRIN 81 MG/1
81 TABLET ORAL DAILY
Qty: 90 TABLET | Refills: 1 | Status: SHIPPED | OUTPATIENT
Start: 2023-09-07

## 2023-09-07 RX ORDER — OLANZAPINE 10 MG/1
INJECTION, POWDER, LYOPHILIZED, FOR SOLUTION INTRAMUSCULAR
COMMUNITY
Start: 2023-06-25

## 2023-09-07 RX ORDER — ATORVASTATIN CALCIUM 40 MG/1
40 TABLET, FILM COATED ORAL DAILY
COMMUNITY

## 2023-09-07 NOTE — PROGRESS NOTES
are patent with mild disease     Coronary Tree      Dominance: Right     LV Analysis  LV function assessed as:Normal.  Ejection Fraction  +----------------------------------------------------------------------+---+  ! Method                                                                ! EF%! +----------------------------------------------------------------------+---+  ! LV gram                                                               !55 !  +----------------------------------------------------------------------+---+     LV Segment Contractility      1 - Normal    3 - Mild         5 - Severe       7 - Dyskinesis   hypokinesis      hypokinesis      2 -           4 - Moderate     6 - Akinesis     8 - Aneurysm   Hypokinesis   hypokinesis     Procedure Data  Procedure Start Time: 09/23/2022 13:26. Procedure End Time: 09/23/2022  13:42. The procedure was explained in detail to the patient. Risks, complications  and alternative treatments were reviewed. Written consent was obtained. Diagnostic Cath Status: Elective    Entry Locations    - Retrograde Percutaneous access was performed through the Right Radial      artery. A 6 Fr sheath was inserted. Hemostasis was successfully obtained      using Vasc Band. Comments: no micro, no ultrasound. Procedure Medications: - Versed I.V. 2 mg.      - Fentanyl I.V. 25 mcg.      - Lidocaine HCl 1% 10mg/ml S.Q. 5 ml. - Heparin I.A. 4000 units. - Verapamil I.A. 2.5 mg.      - Nitroglycerin I.A. 300 mcg.      - 0.9 % NaCl I.V. bolus 250 ml. Catheters and Wires:    - 6 Fr. Radial TIG 4.0 was used for Left coronary angiography. - 6 Fr. Radial TIG 4.0 was used for Right coronary angiography. - 6F Catheter Angled Pigtail was used for Left ventriculography. Contrast Material:    - Isovue 96946 ml    Fluoroscopy Time: Diagnostic: 2:24 minutes. Total: 2:24 minutes. Medical History     Allergies    - *No Known Allergies.      Risk Factors      The

## 2023-12-11 RX ORDER — ATORVASTATIN CALCIUM 40 MG/1
40 TABLET, FILM COATED ORAL DAILY
Qty: 30 TABLET | Refills: 0 | Status: SHIPPED | OUTPATIENT
Start: 2023-12-11

## 2023-12-11 NOTE — TELEPHONE ENCOUNTER
Requested Prescriptions     Pending Prescriptions Disp Refills    atorvastatin (LIPITOR) 40 MG tablet 30 tablet 0     Sig: Take 1 tablet by mouth daily Statement Selected

## 2024-01-03 RX ORDER — ATORVASTATIN CALCIUM 40 MG/1
40 TABLET, FILM COATED ORAL DAILY
Qty: 30 TABLET | Refills: 0 | Status: SHIPPED | OUTPATIENT
Start: 2024-01-03

## 2024-01-03 NOTE — TELEPHONE ENCOUNTER
Nico Daniels is requesting a refill on the following medication(s):  Requested Prescriptions     Pending Prescriptions Disp Refills    atorvastatin (LIPITOR) 40 MG tablet [Pharmacy Med Name: ATORVASTATIN 40 MG TABLET] 30 tablet 0     Sig: take 1 tablet by mouth once daily       Last Visit Date (If Applicable):  7/24/2023    Next Visit Date:    Visit date not found

## 2024-01-12 RX ORDER — ATORVASTATIN CALCIUM 40 MG/1
40 TABLET, FILM COATED ORAL DAILY
Qty: 30 TABLET | Refills: 0 | Status: SHIPPED | OUTPATIENT
Start: 2024-01-12

## 2024-01-12 NOTE — TELEPHONE ENCOUNTER
Requested Prescriptions     Pending Prescriptions Disp Refills    atorvastatin (LIPITOR) 40 MG tablet 30 tablet 0     Sig: Take 1 tablet by mouth daily      On call. Pt PCP is out of office until 1/15, and pt stated that he is out.

## 2024-02-22 RX ORDER — ASPIRIN 81 MG/1
81 TABLET, COATED ORAL DAILY
Qty: 90 TABLET | Refills: 3 | Status: SHIPPED | OUTPATIENT
Start: 2024-02-22

## 2024-03-04 RX ORDER — ATORVASTATIN CALCIUM 40 MG/1
40 TABLET, FILM COATED ORAL DAILY
Qty: 30 TABLET | Refills: 0 | Status: SHIPPED | OUTPATIENT
Start: 2024-03-04

## 2024-03-25 RX ORDER — ATORVASTATIN CALCIUM 40 MG/1
40 TABLET, FILM COATED ORAL DAILY
Qty: 30 TABLET | Refills: 0 | Status: SHIPPED | OUTPATIENT
Start: 2024-03-25 | End: 2024-03-28 | Stop reason: SDUPTHER

## 2024-03-28 ENCOUNTER — OFFICE VISIT (OUTPATIENT)
Dept: CARDIOLOGY | Age: 59
End: 2024-03-28
Payer: COMMERCIAL

## 2024-03-28 VITALS
DIASTOLIC BLOOD PRESSURE: 86 MMHG | HEIGHT: 71 IN | BODY MASS INDEX: 23.1 KG/M2 | WEIGHT: 165 LBS | HEART RATE: 76 BPM | SYSTOLIC BLOOD PRESSURE: 137 MMHG

## 2024-03-28 DIAGNOSIS — E78.49 OTHER HYPERLIPIDEMIA: ICD-10-CM

## 2024-03-28 DIAGNOSIS — I10 PRIMARY HYPERTENSION: ICD-10-CM

## 2024-03-28 DIAGNOSIS — I25.10 CORONARY ARTERY CALCIFICATION: Primary | ICD-10-CM

## 2024-03-28 DIAGNOSIS — I25.84 CORONARY ARTERY CALCIFICATION: Primary | ICD-10-CM

## 2024-03-28 PROCEDURE — 93000 ELECTROCARDIOGRAM COMPLETE: CPT | Performed by: INTERNAL MEDICINE

## 2024-03-28 PROCEDURE — 3079F DIAST BP 80-89 MM HG: CPT | Performed by: INTERNAL MEDICINE

## 2024-03-28 PROCEDURE — 3075F SYST BP GE 130 - 139MM HG: CPT | Performed by: INTERNAL MEDICINE

## 2024-03-28 PROCEDURE — 99214 OFFICE O/P EST MOD 30 MIN: CPT | Performed by: INTERNAL MEDICINE

## 2024-03-28 RX ORDER — ATORVASTATIN CALCIUM 40 MG/1
40 TABLET, FILM COATED ORAL DAILY
Qty: 90 TABLET | Refills: 3 | Status: SHIPPED | OUTPATIENT
Start: 2024-03-28

## 2024-03-28 RX ORDER — OLANZAPINE 10 MG/1
10 TABLET ORAL NIGHTLY
COMMUNITY
Start: 2024-02-16

## 2024-03-28 RX ORDER — ASPIRIN 81 MG/1
81 TABLET ORAL DAILY
Qty: 90 TABLET | Refills: 3 | Status: SHIPPED | OUTPATIENT
Start: 2024-03-28

## 2024-03-28 NOTE — PROGRESS NOTES
[facility] SPECIAL CARE, [facility], [place] CARDIOLOGY A DEPARTMENT OF [facility]  [address] [facility] [address]  Dept: [phone]      CC: CAD, hypertension, hyperlipidemia, heavy smoker, regular drinker      Subjective:  The patient is a 58 y.o. year old, , male is in the office for a follow up visit.  Patient is doing quit well from cardiac stand point. He denies any chest pain or discomfort. No orthopnea or PND. Denies any palpitation, dizziness or syncope. He is completely asymptomatic from cardiac stand point.    Past Medical History:   has a past medical history of Abnormal cardiovascular stress test, Emphysema lung (HCC), and Smoker.    Past Surgical History:   has a past surgical history that includes Colonoscopy (11/01/2019); Hand surgery (Right, 02/24/2021); and Cardiac catheterization (09/23/2022).    Home Medications:  Prior to Admission medications    Medication Sig Start Date End Date Taking? Authorizing Provider   OLANZapine (ZYPREXA) 10 MG tablet Take 1 tablet by mouth nightly at bedtime. 2/16/24  Yes Renea Garcia MD   MAGNESIUM GLUCONATE PO Take 240 mg by mouth in the morning and at bedtime   Yes Renea Garcia MD   Omeprazole Magnesium (PRILOSEC PO) Take by mouth OTC PRN   Yes Renea Garcia MD   atorvastatin (LIPITOR) 40 MG tablet take 1 tablet by mouth once daily 3/25/24  Yes Andrew Villarreal, APRN - CNP   ASPIRIN LOW DOSE 81 MG EC tablet take 1 tablet by mouth once daily 2/22/24  Yes Jessica Carney S, DO   Fish Oil-Krill Oil (KRILL & FISH OIL BLEND PO) Krill-Om-3-Dha-Epa-Phospho-Ast (Megared Omega-3 Krill Oil) [id number] mg Capsule Active 1 CAP PO October 23rd, 2019 12:00am 10/23/19  Yes Renea Garcia MD   LITHIUM CARBONATE  mg 2 times daily (with meals) 7/22/23  Yes Renea Garcia MD       Allergies:  Patient has no known allergies.    Social History:   reports that he has

## 2024-07-30 ENCOUNTER — OFFICE VISIT (OUTPATIENT)
Dept: FAMILY MEDICINE CLINIC | Age: 59
End: 2024-07-30

## 2024-07-30 VITALS
SYSTOLIC BLOOD PRESSURE: 130 MMHG | WEIGHT: 167 LBS | HEART RATE: 72 BPM | DIASTOLIC BLOOD PRESSURE: 74 MMHG | OXYGEN SATURATION: 99 % | BODY MASS INDEX: 23.29 KG/M2

## 2024-07-30 DIAGNOSIS — Z00.00 ROUTINE GENERAL MEDICAL EXAMINATION AT A HEALTH CARE FACILITY: ICD-10-CM

## 2024-07-30 DIAGNOSIS — Z13.220 ENCOUNTER FOR LIPID SCREENING FOR CARDIOVASCULAR DISEASE: ICD-10-CM

## 2024-07-30 DIAGNOSIS — Z87.891 PERSONAL HISTORY OF TOBACCO USE: ICD-10-CM

## 2024-07-30 DIAGNOSIS — Z13.0 SCREENING FOR DEFICIENCY ANEMIA: ICD-10-CM

## 2024-07-30 DIAGNOSIS — Z00.00 ENCOUNTER FOR WELL ADULT EXAM WITHOUT ABNORMAL FINDINGS: ICD-10-CM

## 2024-07-30 DIAGNOSIS — Z13.6 ENCOUNTER FOR LIPID SCREENING FOR CARDIOVASCULAR DISEASE: ICD-10-CM

## 2024-07-30 DIAGNOSIS — Z13.1 DIABETES MELLITUS SCREENING: Primary | ICD-10-CM

## 2024-07-30 DIAGNOSIS — Z12.5 PROSTATE CANCER SCREENING: ICD-10-CM

## 2024-07-30 LAB — HBA1C MFR BLD: 5.8 %

## 2024-07-30 RX ORDER — LITHIUM CARBONATE 300 MG
150 TABLET ORAL 2 TIMES DAILY
COMMUNITY
Start: 2024-05-18

## 2024-07-30 RX ORDER — PROPRANOLOL HYDROCHLORIDE 20 MG/1
20 TABLET ORAL 2 TIMES DAILY
COMMUNITY
Start: 2024-05-14 | End: 2024-07-30

## 2024-07-30 RX ORDER — OLANZAPINE 15 MG/1
15 TABLET ORAL NIGHTLY
COMMUNITY
Start: 2024-05-18

## 2024-07-30 NOTE — PROGRESS NOTES
Well Adult Note  Name: Nico Daniels Today’s Date: 2024   MRN: [id number] Sex: Male   Age: 59 y.o. Ethnicity: Non- / Non    : 1965 Race: White (non-)      Nico Daniels is here for a well adult exam.       Subjective   History:  Smoker, hyperlipidemia, htn, bipolar- followed by psych    Review of Systems   Constitutional:  Negative for chills, fatigue, fever and unexpected weight change.   HENT:  Negative for congestion, ear pain, rhinorrhea, sinus pressure and sinus pain.    Eyes:  Negative for discharge, itching and visual disturbance.   Respiratory:  Negative for cough, chest tightness, shortness of breath and wheezing.    Cardiovascular:  Negative for chest pain, palpitations and leg swelling.   Gastrointestinal:  Negative for abdominal pain, constipation, diarrhea and nausea.   Endocrine: Negative for cold intolerance, heat intolerance, polydipsia and polyphagia.   Genitourinary:  Negative for dysuria, flank pain and frequency.   Musculoskeletal:  Negative for arthralgias, back pain and myalgias.   Skin:  Negative for color change.   Allergic/Immunologic: Negative for environmental allergies and food allergies.   Neurological:  Negative for dizziness, weakness, light-headedness, numbness and headaches.   Psychiatric/Behavioral:  Negative for agitation, behavioral problems and confusion. The patient is not nervous/anxious.        No Known Allergies  Prior to Visit Medications    Medication Sig Taking? Authorizing Provider   OLANZapine (ZYPREXA) 15 MG tablet Take 1 tablet by mouth nightly at bedtime. Yes eRnea Garcia MD   lithium 300 MG tablet Take 0.5 tablets by mouth 2 times daily Yes Renea Garcia MD   MAGNESIUM GLUCONATE PO Take 240 mg by mouth in the morning and at bedtime Yes Renea Garcia MD   Omeprazole Magnesium (PRILOSEC PO) Take by mouth OTC PRN Yes Renea Garcia MD   atorvastatin (LIPITOR) 40 MG tablet Take 1 tablet by mouth

## 2024-08-02 ASSESSMENT — ENCOUNTER SYMPTOMS
RHINORRHEA: 0
EYE ITCHING: 0
SINUS PAIN: 0
NAUSEA: 0
WHEEZING: 0
ABDOMINAL PAIN: 0
COUGH: 0
CHEST TIGHTNESS: 0
BACK PAIN: 0
SINUS PRESSURE: 0
DIARRHEA: 0
COLOR CHANGE: 0
SHORTNESS OF BREATH: 0
CONSTIPATION: 0
EYE DISCHARGE: 0

## 2024-08-14 ENCOUNTER — HOSPITAL ENCOUNTER (OUTPATIENT)
Age: 59
Discharge: HOME | End: 2024-08-14
Payer: COMMERCIAL

## 2024-08-14 DIAGNOSIS — Z00.00 ROUTINE GENERAL MEDICAL EXAMINATION AT A HEALTH CARE FACILITY: ICD-10-CM

## 2024-08-14 DIAGNOSIS — Z12.5 PROSTATE CANCER SCREENING: ICD-10-CM

## 2024-08-14 DIAGNOSIS — Z13.220 ENCOUNTER FOR LIPID SCREENING FOR CARDIOVASCULAR DISEASE: ICD-10-CM

## 2024-08-14 DIAGNOSIS — Z13.0 SCREENING FOR DEFICIENCY ANEMIA: ICD-10-CM

## 2024-08-14 DIAGNOSIS — Z13.6 ENCOUNTER FOR LIPID SCREENING FOR CARDIOVASCULAR DISEASE: ICD-10-CM

## 2024-08-14 LAB
ALBUMIN SERPL-MCNC: 4.7 G/DL (ref 3.5–5.2)
ALBUMIN/GLOB SERPL: 2 {RATIO} (ref 1–2.5)
ALP SERPL-CCNC: 58 U/L (ref 40–129)
ALT SERPL-CCNC: 28 U/L (ref 10–50)
ANION GAP SERPL CALCULATED.3IONS-SCNC: 13 MMOL/L (ref 9–16)
AST SERPL-CCNC: 29 U/L (ref 10–50)
BASOPHILS # BLD: 0.06 K/UL (ref 0–0.2)
BASOPHILS NFR BLD: 1 % (ref 0–2)
BILIRUB SERPL-MCNC: 0.7 MG/DL (ref 0–1.2)
BUN SERPL-MCNC: 9 MG/DL (ref 6–20)
CALCIUM SERPL-MCNC: 9.4 MG/DL (ref 8.6–10.4)
CHLORIDE SERPL-SCNC: 103 MMOL/L (ref 98–107)
CHOLEST SERPL-MCNC: 138 MG/DL (ref 0–199)
CHOLESTEROL/HDL RATIO: 2
CO2 SERPL-SCNC: 23 MMOL/L (ref 20–31)
CREAT SERPL-MCNC: 0.9 MG/DL (ref 0.7–1.2)
EOSINOPHIL # BLD: 0.13 K/UL (ref 0–0.44)
EOSINOPHILS RELATIVE PERCENT: 1 % (ref 1–4)
ERYTHROCYTE [DISTWIDTH] IN BLOOD BY AUTOMATED COUNT: 11.8 % (ref 11.8–14.4)
GFR, ESTIMATED: >90 ML/MIN/1.73M2
GLUCOSE P FAST SERPL-MCNC: 116 MG/DL (ref 74–99)
HCT VFR BLD AUTO: 45.6 % (ref 40.7–50.3)
HDLC SERPL-MCNC: 58 MG/DL
HGB BLD-MCNC: 14.8 G/DL (ref 13–17)
IMM GRANULOCYTES # BLD AUTO: <0.03 K/UL (ref 0–0.3)
IMM GRANULOCYTES NFR BLD: 0 %
LDLC SERPL CALC-MCNC: 50 MG/DL (ref 0–100)
LYMPHOCYTES NFR BLD: 2.19 K/UL (ref 1.1–3.7)
LYMPHOCYTES RELATIVE PERCENT: 24 % (ref 24–43)
MCH RBC QN AUTO: 32.6 PG (ref 25.2–33.5)
MCHC RBC AUTO-ENTMCNC: 32.5 G/DL (ref 28.4–34.8)
MCV RBC AUTO: 100.4 FL (ref 82.6–102.9)
MONOCYTES NFR BLD: 0.61 K/UL (ref 0.1–1.2)
MONOCYTES NFR BLD: 7 % (ref 3–12)
NEUTROPHILS NFR BLD: 67 % (ref 36–65)
NEUTS SEG NFR BLD: 5.96 K/UL (ref 1.5–8.1)
NRBC BLD-RTO: 0 PER 100 WBC
PLATELET # BLD AUTO: 222 K/UL (ref 138–453)
PMV BLD AUTO: 11 FL (ref 8.1–13.5)
POTASSIUM SERPL-SCNC: 4.5 MMOL/L (ref 3.7–5.3)
PROT SERPL-MCNC: 7.3 G/DL (ref 6.6–8.7)
PSA SERPL-MCNC: 1.1 NG/ML (ref 0–4)
RBC # BLD AUTO: 4.54 M/UL (ref 4.21–5.77)
SODIUM SERPL-SCNC: 139 MMOL/L (ref 136–145)
TRIGL SERPL-MCNC: 154 MG/DL
VLDLC SERPL CALC-MCNC: 31 MG/DL
WBC OTHER # BLD: 9 K/UL (ref 3.5–11.3)

## 2024-08-14 PROCEDURE — 36415 COLL VENOUS BLD VENIPUNCTURE: CPT

## 2024-08-14 PROCEDURE — 80061 LIPID PANEL: CPT

## 2024-08-14 PROCEDURE — 80053 COMPREHEN METABOLIC PANEL: CPT

## 2024-08-14 PROCEDURE — 85025 COMPLETE CBC W/AUTO DIFF WBC: CPT

## 2024-08-14 PROCEDURE — G0103 PSA SCREENING: HCPCS

## 2024-08-21 RX ORDER — ATORVASTATIN CALCIUM 40 MG/1
40 TABLET, FILM COATED ORAL DAILY
Qty: 90 TABLET | Refills: 3 | Status: SHIPPED | OUTPATIENT
Start: 2024-08-21

## 2024-10-17 ENCOUNTER — OFFICE VISIT (OUTPATIENT)
Dept: CARDIOLOGY | Age: 59
End: 2024-10-17
Payer: COMMERCIAL

## 2024-10-17 VITALS
HEART RATE: 95 BPM | HEIGHT: 71 IN | WEIGHT: 165 LBS | RESPIRATION RATE: 17 BRPM | OXYGEN SATURATION: 97 % | DIASTOLIC BLOOD PRESSURE: 84 MMHG | BODY MASS INDEX: 23.1 KG/M2 | SYSTOLIC BLOOD PRESSURE: 133 MMHG

## 2024-10-17 DIAGNOSIS — I25.10 MILD CAD WITHOUT ANGINA PECTORIS: ICD-10-CM

## 2024-10-17 DIAGNOSIS — E78.5 DYSLIPIDEMIA: ICD-10-CM

## 2024-10-17 DIAGNOSIS — Z72.0 TOBACCO ABUSE: ICD-10-CM

## 2024-10-17 DIAGNOSIS — I10 PRIMARY HYPERTENSION: ICD-10-CM

## 2024-10-17 DIAGNOSIS — I25.10 CORONARY ARTERY CALCIFICATION: Primary | ICD-10-CM

## 2024-10-17 PROCEDURE — 3075F SYST BP GE 130 - 139MM HG: CPT | Performed by: STUDENT IN AN ORGANIZED HEALTH CARE EDUCATION/TRAINING PROGRAM

## 2024-10-17 PROCEDURE — 3079F DIAST BP 80-89 MM HG: CPT | Performed by: STUDENT IN AN ORGANIZED HEALTH CARE EDUCATION/TRAINING PROGRAM

## 2024-10-17 PROCEDURE — 99214 OFFICE O/P EST MOD 30 MIN: CPT | Performed by: STUDENT IN AN ORGANIZED HEALTH CARE EDUCATION/TRAINING PROGRAM

## 2024-10-17 PROCEDURE — 93000 ELECTROCARDIOGRAM COMPLETE: CPT | Performed by: STUDENT IN AN ORGANIZED HEALTH CARE EDUCATION/TRAINING PROGRAM

## 2024-10-17 RX ORDER — ASPIRIN 81 MG/1
81 TABLET ORAL DAILY
Qty: 90 TABLET | Refills: 3 | Status: SHIPPED | OUTPATIENT
Start: 2024-10-17

## 2024-10-17 RX ORDER — VARENICLINE TARTRATE 1 MG/1
1 TABLET, FILM COATED ORAL 2 TIMES DAILY
Qty: 60 TABLET | Refills: 5 | Status: SHIPPED | OUTPATIENT
Start: 2024-10-17

## 2024-10-17 NOTE — PROGRESS NOTES
Retrograde Percutaneous access was performed through the Right Radial      artery. A 6 Fr sheath was inserted. Hemostasis was successfully obtained      using Vasc Band.      Comments: no micro, no ultrasound.    Procedure Medications:    - Versed I.V. 2 mg.      - Fentanyl I.V. 25 mcg.      - Lidocaine HCl 1% 10mg/ml S.Q. 5 ml.      - Heparin I.A. 4000 units.      - Verapamil I.A. 2.5 mg.      - Nitroglycerin I.A. 300 mcg.      - 0.9 % NaCl I.V. bolus 250 ml.    Catheters and Wires:    - 6 Fr. Radial TIG 4.0 was used for Left coronary angiography.      - 6 Fr. Radial TIG 4.0 was used for Right coronary angiography.      - 6F Catheter Angled Pigtail was used for Left ventriculography.    Contrast Material:    - Isovue 32516 ml    Fluoroscopy Time: Diagnostic: 2:24 minutes. Total: 2:24 minutes.     Medical History     Allergies    - *No Known Allergies.     Risk Factors      The patient risk factors include:last creatinine: 0.7 mg/dl, creatinine   clearance: 129.23 ml/min and Current - Every day tobacco use.     Admission Data  Admission Date: 09/23/2022    Admission Status: Outpatient.        -The patient's anginal syndrome was assessed as CCS II according to the      Kleberg clinical classification.     Hemodynamics      Condition: Baseline Room Air      Estimated: 236.52Heart Rate: 77 bpm     Pressure  +-----+--------------------------------------------------------------------+  !Site !Pressure                                                            !  +-----+--------------------------------------------------------------------+  !AO   !92/50 (71)                                                          !  +-----+--------------------------------------------------------------------+  !AO   !80/48 (62)                                                          !  +-----+--------------------------------------------------------------------+  !LV   !91/-4 ,0

## 2025-01-13 ENCOUNTER — HOSPITAL ENCOUNTER (OUTPATIENT)
Dept: CT IMAGING | Age: 60
Discharge: HOME | End: 2025-01-15
Payer: COMMERCIAL

## 2025-01-13 DIAGNOSIS — Z87.891 PERSONAL HISTORY OF TOBACCO USE: ICD-10-CM

## 2025-01-13 PROCEDURE — 71271 CT THORAX LUNG CANCER SCR C-: CPT

## 2025-01-30 ENCOUNTER — OFFICE VISIT (OUTPATIENT)
Dept: FAMILY MEDICINE CLINIC | Age: 60
End: 2025-01-30
Payer: COMMERCIAL

## 2025-01-30 VITALS
SYSTOLIC BLOOD PRESSURE: 132 MMHG | WEIGHT: 166 LBS | DIASTOLIC BLOOD PRESSURE: 82 MMHG | OXYGEN SATURATION: 97 % | HEART RATE: 72 BPM | BODY MASS INDEX: 23.15 KG/M2

## 2025-01-30 DIAGNOSIS — Z13.6 ENCOUNTER FOR LIPID SCREENING FOR CARDIOVASCULAR DISEASE: ICD-10-CM

## 2025-01-30 DIAGNOSIS — Z13.220 ENCOUNTER FOR LIPID SCREENING FOR CARDIOVASCULAR DISEASE: ICD-10-CM

## 2025-01-30 DIAGNOSIS — Z00.00 ROUTINE GENERAL MEDICAL EXAMINATION AT A HEALTH CARE FACILITY: ICD-10-CM

## 2025-01-30 DIAGNOSIS — Z00.00 ENCOUNTER FOR WELL ADULT EXAM WITHOUT ABNORMAL FINDINGS: Primary | ICD-10-CM

## 2025-01-30 PROCEDURE — 99396 PREV VISIT EST AGE 40-64: CPT

## 2025-01-30 RX ORDER — PROPRANOLOL HYDROCHLORIDE 10 MG/1
10 TABLET ORAL 2 TIMES DAILY
COMMUNITY
Start: 2024-12-20

## 2025-01-30 SDOH — ECONOMIC STABILITY: FOOD INSECURITY: WITHIN THE PAST 12 MONTHS, THE FOOD YOU BOUGHT JUST DIDN'T LAST AND YOU DIDN'T HAVE MONEY TO GET MORE.: NEVER TRUE

## 2025-01-30 SDOH — ECONOMIC STABILITY: FOOD INSECURITY: WITHIN THE PAST 12 MONTHS, YOU WORRIED THAT YOUR FOOD WOULD RUN OUT BEFORE YOU GOT MONEY TO BUY MORE.: NEVER TRUE

## 2025-01-30 ASSESSMENT — PATIENT HEALTH QUESTIONNAIRE - PHQ9
7. TROUBLE CONCENTRATING ON THINGS, SUCH AS READING THE NEWSPAPER OR WATCHING TELEVISION: NOT AT ALL
3. TROUBLE FALLING OR STAYING ASLEEP: NOT AT ALL
SUM OF ALL RESPONSES TO PHQ QUESTIONS 1-9: 0
1. LITTLE INTEREST OR PLEASURE IN DOING THINGS: NOT AT ALL
SUM OF ALL RESPONSES TO PHQ9 QUESTIONS 1 & 2: 0
6. FEELING BAD ABOUT YOURSELF - OR THAT YOU ARE A FAILURE OR HAVE LET YOURSELF OR YOUR FAMILY DOWN: NOT AT ALL
9. THOUGHTS THAT YOU WOULD BE BETTER OFF DEAD, OR OF HURTING YOURSELF: NOT AT ALL
4. FEELING TIRED OR HAVING LITTLE ENERGY: NOT AT ALL
2. FEELING DOWN, DEPRESSED OR HOPELESS: NOT AT ALL
10. IF YOU CHECKED OFF ANY PROBLEMS, HOW DIFFICULT HAVE THESE PROBLEMS MADE IT FOR YOU TO DO YOUR WORK, TAKE CARE OF THINGS AT HOME, OR GET ALONG WITH OTHER PEOPLE: NOT DIFFICULT AT ALL
SUM OF ALL RESPONSES TO PHQ QUESTIONS 1-9: 0
5. POOR APPETITE OR OVEREATING: NOT AT ALL
8. MOVING OR SPEAKING SO SLOWLY THAT OTHER PEOPLE COULD HAVE NOTICED. OR THE OPPOSITE, BEING SO FIGETY OR RESTLESS THAT YOU HAVE BEEN MOVING AROUND A LOT MORE THAN USUAL: NOT AT ALL

## 2025-02-05 ASSESSMENT — ENCOUNTER SYMPTOMS
COUGH: 0
SINUS PAIN: 0
WHEEZING: 0
DIARRHEA: 0
CONSTIPATION: 0
ABDOMINAL PAIN: 0
SINUS PRESSURE: 0
RHINORRHEA: 0
EYE DISCHARGE: 0
SHORTNESS OF BREATH: 0
EYE ITCHING: 0
NAUSEA: 0
COLOR CHANGE: 0
CHEST TIGHTNESS: 0
BACK PAIN: 0

## 2025-02-05 NOTE — PROGRESS NOTES
2025    Nico Daniels (:  1965) is a 59 y.o. male, here for a preventive medicine evaluation.    Subjective   Patient Active Problem List   Diagnosis    Tobacco abuse    Infection of flexor tendon sheath    Anxiety and depression    Erectile dysfunction    Bipolar I disorder, most recent episode (or current) manic, moderate (HCC)       Review of Systems   Constitutional:  Negative for chills, fatigue, fever and unexpected weight change.   HENT:  Negative for congestion, ear pain, rhinorrhea, sinus pressure and sinus pain.    Eyes:  Negative for discharge, itching and visual disturbance.   Respiratory:  Negative for cough, chest tightness, shortness of breath and wheezing.    Cardiovascular:  Negative for chest pain, palpitations and leg swelling.   Gastrointestinal:  Negative for abdominal pain, constipation, diarrhea and nausea.   Endocrine: Negative for cold intolerance, heat intolerance, polydipsia and polyphagia.   Genitourinary:  Negative for dysuria, flank pain and frequency.   Musculoskeletal:  Negative for arthralgias, back pain and myalgias.   Skin:  Negative for color change.   Allergic/Immunologic: Negative for environmental allergies and food allergies.   Neurological:  Negative for dizziness, weakness, light-headedness, numbness and headaches.   Psychiatric/Behavioral:  Negative for agitation, behavioral problems and confusion. The patient is not nervous/anxious.        Prior to Visit Medications    Medication Sig Taking? Authorizing Provider   propranolol (INDERAL) 10 MG tablet Take 1 tablet by mouth 2 times daily Yes Renea Garcia MD   aspirin (ASPIRIN LOW DOSE) 81 MG EC tablet Take 1 tablet by mouth daily Yes Hector Abernathy MD   atorvastatin (LIPITOR) 40 MG tablet Take 1 tablet by mouth daily Yes Andrew Villarreal APRN - CNP   MAGNESIUM GLUCONATE PO Take 240 mg by mouth in the morning and at bedtime Yes Renea Garcia MD   Fish Oil-Krill Oil (KRILL & FISH OIL 
for abdominal pain.   Skin:  Negative for color change.              1/30/2025     9:42 AM 1/17/2023     2:19 PM 8/8/2022     4:48 PM 7/25/2022     4:10 PM 3/24/2022     2:08 PM 7/19/2021     2:56 PM 1/30/2019     9:39 AM   PHQ Scores   PHQ2 Score 0 0 0 0 0 0 0   PHQ9 Score 0 0 0 0 0 0 0     Interpretation of Total Score Depression Severity: 1-4 = Minimal depression, 5-9 = Mild depression, 10-14 = Moderate depression, 15-19 = Moderately severe depression, 20-27 = Severe depression     Objective:     Vitals:    01/30/25 0939   BP: 132/82   Site: Right Upper Arm   Position: Sitting   Pulse: 72   SpO2: 97%   Weight: 75.3 kg (166 lb)        Physical Exam  Vitals and nursing note reviewed.   HENT:      Head: Normocephalic.      Nose: Nose normal. No congestion.   Cardiovascular:      Rate and Rhythm: Normal rate and regular rhythm.      Pulses: Normal pulses.      Heart sounds: Normal heart sounds. No murmur heard.  Pulmonary:      Effort: Pulmonary effort is normal.      Breath sounds: Normal breath sounds.   Abdominal:      General: Bowel sounds are normal.   Skin:     Capillary Refill: Capillary refill takes less than 2 seconds.   Neurological:      Mental Status: He is alert.           Please note that this chart was generated using voice recognition Dragon dictation software.  Although every effort was made to ensure the accuracy of this automated transcription, some errors in transcription may have occurred.    Electronically signed by MIRYAM Strauss CNP on 1/30/2025 at 10:04 AM.

## 2025-04-30 ENCOUNTER — HOSPITAL ENCOUNTER (OUTPATIENT)
Age: 60
Setting detail: SPECIMEN
Discharge: HOME | End: 2025-04-30

## 2025-04-30 DIAGNOSIS — Z00.00 ROUTINE GENERAL MEDICAL EXAMINATION AT A HEALTH CARE FACILITY: ICD-10-CM

## 2025-04-30 DIAGNOSIS — Z13.220 ENCOUNTER FOR LIPID SCREENING FOR CARDIOVASCULAR DISEASE: ICD-10-CM

## 2025-04-30 DIAGNOSIS — Z13.6 ENCOUNTER FOR LIPID SCREENING FOR CARDIOVASCULAR DISEASE: ICD-10-CM

## 2025-04-30 LAB
ALBUMIN SERPL-MCNC: 4 G/DL (ref 3.5–5.2)
ALBUMIN/GLOB SERPL: 1.6 {RATIO} (ref 1–2.5)
ALP SERPL-CCNC: 73 U/L (ref 40–129)
ALT SERPL-CCNC: 22 U/L (ref 10–50)
ANION GAP SERPL CALCULATED.3IONS-SCNC: 10 MMOL/L (ref 9–16)
AST SERPL-CCNC: 23 U/L (ref 10–50)
BASOPHILS # BLD: 0.06 K/UL (ref 0–0.2)
BASOPHILS NFR BLD: 1 % (ref 0–2)
BILIRUB SERPL-MCNC: 0.4 MG/DL (ref 0–1.2)
BUN SERPL-MCNC: 5 MG/DL (ref 6–20)
CALCIUM SERPL-MCNC: 9.1 MG/DL (ref 8.6–10.4)
CHLORIDE SERPL-SCNC: 101 MMOL/L (ref 98–107)
CHOLEST SERPL-MCNC: 114 MG/DL (ref 0–199)
CHOLESTEROL/HDL RATIO: 2.2
CO2 SERPL-SCNC: 27 MMOL/L (ref 20–31)
CREAT SERPL-MCNC: 0.7 MG/DL (ref 0.7–1.2)
EOSINOPHIL # BLD: 0.17 K/UL (ref 0–0.44)
EOSINOPHILS RELATIVE PERCENT: 2 % (ref 1–4)
ERYTHROCYTE [DISTWIDTH] IN BLOOD BY AUTOMATED COUNT: 12.1 % (ref 11.8–14.4)
GFR, ESTIMATED: >90 ML/MIN/1.73M2
GLUCOSE P FAST SERPL-MCNC: 101 MG/DL (ref 74–99)
HCT VFR BLD AUTO: 44.7 % (ref 40.7–50.3)
HDLC SERPL-MCNC: 52 MG/DL
HGB BLD-MCNC: 14.3 G/DL (ref 13–17)
IMM GRANULOCYTES # BLD AUTO: 0.03 K/UL (ref 0–0.3)
IMM GRANULOCYTES NFR BLD: 0 %
LDLC SERPL CALC-MCNC: 33 MG/DL (ref 0–100)
LYMPHOCYTES NFR BLD: 2.24 K/UL (ref 1.1–3.7)
LYMPHOCYTES RELATIVE PERCENT: 25 % (ref 24–43)
MCH RBC QN AUTO: 32.4 PG (ref 25.2–33.5)
MCHC RBC AUTO-ENTMCNC: 32 G/DL (ref 28.4–34.8)
MCV RBC AUTO: 101.1 FL (ref 82.6–102.9)
MONOCYTES NFR BLD: 0.75 K/UL (ref 0.1–1.2)
MONOCYTES NFR BLD: 8 % (ref 3–12)
NEUTROPHILS NFR BLD: 64 % (ref 36–65)
NEUTS SEG NFR BLD: 5.86 K/UL (ref 1.5–8.1)
NRBC BLD-RTO: 0 PER 100 WBC
PLATELET # BLD AUTO: 236 K/UL (ref 138–453)
PMV BLD AUTO: 11.1 FL (ref 8.1–13.5)
POTASSIUM SERPL-SCNC: 4.8 MMOL/L (ref 3.7–5.3)
PROT SERPL-MCNC: 6.5 G/DL (ref 6.6–8.7)
RBC # BLD AUTO: 4.42 M/UL (ref 4.21–5.77)
SODIUM SERPL-SCNC: 138 MMOL/L (ref 136–145)
TRIGL SERPL-MCNC: 145 MG/DL (ref 0–149)
VLDLC SERPL CALC-MCNC: 29 MG/DL (ref 1–30)
WBC OTHER # BLD: 9.1 K/UL (ref 3.5–11.3)

## 2025-05-01 ENCOUNTER — RESULTS FOLLOW-UP (OUTPATIENT)
Dept: FAMILY MEDICINE CLINIC | Age: 60
End: 2025-05-01

## 2025-07-30 ENCOUNTER — OFFICE VISIT (OUTPATIENT)
Dept: FAMILY MEDICINE CLINIC | Age: 60
End: 2025-07-30
Payer: COMMERCIAL

## 2025-07-30 VITALS
OXYGEN SATURATION: 98 % | DIASTOLIC BLOOD PRESSURE: 78 MMHG | SYSTOLIC BLOOD PRESSURE: 118 MMHG | HEART RATE: 65 BPM | BODY MASS INDEX: 23.29 KG/M2 | WEIGHT: 167 LBS

## 2025-07-30 DIAGNOSIS — Z13.1 DIABETES MELLITUS SCREENING: Primary | ICD-10-CM

## 2025-07-30 DIAGNOSIS — E78.2 MIXED HYPERLIPIDEMIA: ICD-10-CM

## 2025-07-30 LAB — HBA1C MFR BLD: 5.9 %

## 2025-07-30 PROCEDURE — 99213 OFFICE O/P EST LOW 20 MIN: CPT

## 2025-07-30 PROCEDURE — 83036 HEMOGLOBIN GLYCOSYLATED A1C: CPT

## 2025-07-30 RX ORDER — OLANZAPINE 10 MG/1
TABLET, FILM COATED ORAL
COMMUNITY
Start: 2025-07-21

## 2025-07-30 RX ORDER — ASPIRIN 81 MG/1
81 TABLET ORAL DAILY
Qty: 90 TABLET | Refills: 3 | Status: SHIPPED | OUTPATIENT
Start: 2025-07-30

## 2025-08-06 RX ORDER — ATORVASTATIN CALCIUM 40 MG/1
40 TABLET, FILM COATED ORAL DAILY
Qty: 90 TABLET | Refills: 3
Start: 2025-08-06

## 2025-08-06 ASSESSMENT — ENCOUNTER SYMPTOMS
WHEEZING: 0
SINUS PAIN: 0
BACK PAIN: 0
COUGH: 0
NAUSEA: 0
EYE DISCHARGE: 0
CONSTIPATION: 0
RHINORRHEA: 0
CHEST TIGHTNESS: 0
SINUS PRESSURE: 0
EYE ITCHING: 0
DIARRHEA: 0
ABDOMINAL PAIN: 0
COLOR CHANGE: 0
SHORTNESS OF BREATH: 0

## (undated) DEVICE — GLOVE ORANGE PI 8   MSG9080

## (undated) DEVICE — ZIMMER® STERILE DISPOSABLE TOURNIQUET CUFF WITH PLC, DUAL PORT, SINGLE BLADDER, 18 IN. (46 CM)

## (undated) DEVICE — ESMARK: Brand: DEROYAL

## (undated) DEVICE — PACK SURG PROC REMINDER N WVN DISPOSABLE BEAC TIME OUT

## (undated) DEVICE — BANDAGE ELASTIC SELF-CLS EZE BND 3INX5YD

## (undated) DEVICE — GLOVE ORANGE PI 7   MSG9070

## (undated) DEVICE — SOLUTION IV IRRIG POUR BRL 0.9% SODIUM CHL 2F7124

## (undated) DEVICE — TUBING, SUCTION, 3/16" X 20', STRAIGHT: Brand: MEDLINE

## (undated) DEVICE — GAUZE,SPONGE,FLUFF,6"X6.75",STRL,5/TRAY: Brand: MEDLINE

## (undated) DEVICE — GLOVE ORANGE PI 7 1/2   MSG9075

## (undated) DEVICE — SUTURE ETHLN SZ 4-0 L18IN NONABSORBABLE BLK L13MM P-3 3/8 699G

## (undated) DEVICE — MINOR EXTREMITY PACK: Brand: MEDLINE INDUSTRIES, INC.

## (undated) DEVICE — SUTURE COAT VCRL SZ 4-0 L18IN ABSRB UD L16MM PC-3 3/8 CIR J845G

## (undated) DEVICE — 4-PORT MANIFOLD: Brand: NEPTUNE 2